# Patient Record
Sex: MALE | Race: WHITE | NOT HISPANIC OR LATINO | URBAN - METROPOLITAN AREA
[De-identification: names, ages, dates, MRNs, and addresses within clinical notes are randomized per-mention and may not be internally consistent; named-entity substitution may affect disease eponyms.]

---

## 2017-03-07 ENCOUNTER — EMERGENCY (EMERGENCY)
Facility: HOSPITAL | Age: 42
LOS: 0 days | Discharge: HOME | End: 2017-03-07

## 2017-03-21 ENCOUNTER — APPOINTMENT (OUTPATIENT)
Dept: SURGERY | Facility: CLINIC | Age: 42
End: 2017-03-21
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
## 2017-06-27 DIAGNOSIS — I10 ESSENTIAL (PRIMARY) HYPERTENSION: ICD-10-CM

## 2017-06-27 DIAGNOSIS — M79.89 OTHER SPECIFIED SOFT TISSUE DISORDERS: ICD-10-CM
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
## 2017-06-27 DIAGNOSIS — S62.306A UNSPECIFIED FRACTURE OF FIFTH METACARPAL BONE, RIGHT HAND, INITIAL ENCOUNTER FOR CLOSED FRACTURE: ICD-10-CM
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
## 2018-04-04 ENCOUNTER — TRANSCRIPTION ENCOUNTER (OUTPATIENT)
Age: 43
End: 2018-04-04

## 2018-04-20 ENCOUNTER — OUTPATIENT (OUTPATIENT)
Dept: OUTPATIENT SERVICES | Facility: HOSPITAL | Age: 43
LOS: 1 days | Discharge: HOME | End: 2018-04-20

## 2018-04-30 DIAGNOSIS — M79.89 OTHER SPECIFIED SOFT TISSUE DISORDERS: ICD-10-CM

## 2018-05-07 ENCOUNTER — EMERGENCY (EMERGENCY)
Facility: HOSPITAL | Age: 43
LOS: 0 days | Discharge: HOME | End: 2018-05-07
Attending: EMERGENCY MEDICINE | Admitting: EMERGENCY MEDICINE

## 2018-05-07 VITALS
HEART RATE: 87 BPM | WEIGHT: 315 LBS | TEMPERATURE: 100 F | OXYGEN SATURATION: 98 % | SYSTOLIC BLOOD PRESSURE: 169 MMHG | DIASTOLIC BLOOD PRESSURE: 94 MMHG | RESPIRATION RATE: 16 BRPM | HEIGHT: 70 IN
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
## 2018-05-07 DIAGNOSIS — S61.211A LACERATION WITHOUT FOREIGN BODY OF LEFT INDEX FINGER WITHOUT DAMAGE TO NAIL, INITIAL ENCOUNTER: ICD-10-CM
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
## 2018-05-07 NOTE — ED PROVIDER NOTE - ATTENDING CONTRIBUTION TO CARE
42 yo M presents with c/o cut to left index finger sustained while cutting vegetables.  Tetanus UTD.  On exam pt in NAD AAO x 3, + skin avulsion with involvement of distal nail, no bleeding, good ROM

## 2018-05-07 NOTE — ED PROVIDER NOTE - OBJECTIVE STATEMENT
44 yo M with no significant PMHx presents to the ED c/o laceration to left index finger. Pt was cutting vegetables and accidentally cut himself. Pt is up to date with tetanus. Pt denies fever, chills, nausea, numbness, weakness.

## 2018-05-07 NOTE — ED PROVIDER NOTE - PHYSICAL EXAMINATION
VITAL SIGNS: I have reviewed nursing notes and confirm.  CONSTITUTIONAL: Well-developed; well-nourished; in no acute distress.  SKIN: Skin exam is warm and dry, no acute rash.  HEAD: Normocephalic; atraumatic.  EYES: Conjunctiva and sclera clear.  EXT: Normal ROM. No clubbing, cyanosis or edema. (+) small avulsion injury to left index finger without active bleeding. Sensation intact. CR < 2 seconds.   NEURO: Alert, oriented. Grossly unremarkable. No focal deficits.

## 2019-03-20 ENCOUNTER — EMERGENCY (EMERGENCY)
Facility: HOSPITAL | Age: 44
LOS: 0 days | Discharge: HOME | End: 2019-03-20
Attending: EMERGENCY MEDICINE | Admitting: EMERGENCY MEDICINE

## 2019-03-20 VITALS
HEIGHT: 70 IN | RESPIRATION RATE: 20 BRPM | SYSTOLIC BLOOD PRESSURE: 197 MMHG | TEMPERATURE: 98 F | HEART RATE: 98 BPM | DIASTOLIC BLOOD PRESSURE: 104 MMHG | WEIGHT: 315 LBS | OXYGEN SATURATION: 98 %
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
## 2019-03-20 DIAGNOSIS — S63.602A UNSPECIFIED SPRAIN OF LEFT THUMB, INITIAL ENCOUNTER: ICD-10-CM
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
## 2019-03-20 DIAGNOSIS — F17.200 NICOTINE DEPENDENCE, UNSPECIFIED, UNCOMPLICATED: ICD-10-CM

## 2019-03-20 DIAGNOSIS — I10 ESSENTIAL (PRIMARY) HYPERTENSION: ICD-10-CM

## 2019-03-20 DIAGNOSIS — M79.643 PAIN IN UNSPECIFIED HAND: ICD-10-CM
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
## 2019-03-20 NOTE — ED PROVIDER NOTE - PHYSICAL EXAMINATION
VITAL SIGNS: AFebrile, vital signs stable  CONSTITUTIONAL: Well-developed; well-nourished; in no acute distress.  SKIN: Skin exam is warm and dry, no acute rash.  HEAD: Normocephalic; atraumatic.  EYES: Pupils equal round reactive to light, Extraocular movements intact; conjunctiva and sclera clear.  ENT: No nasal discharge; airway clear. Moist mucus membranes.  NECK: Supple; non tender. No rigidity  CARD: Regular rate and rhythm. Normal S1, S2; no murmurs, gallops, or rubs.  RESP: Lungs clear to auscultation bilaterally. No wheezes, rales or rhonchi.  ABD: Abdomen soft; non-tender; non-distended;  no hepatosplenomegaly. No costovertebral angle tenderness.   EXT: Normal ROM.  L thumbbasal carpometacarpal area localized pain without obvious swelling or loss in ROM.  No weaknesses observed  NEURO: Alert and oriented x 3. No focal deficits.  PSYCH: Cooperative, appropriate.

## 2019-03-20 NOTE — ED PROVIDER NOTE - CARE PROVIDER_API CALL
Kenn Lyles)  Orthopaedic Surgery  [address] [place] [address]  Phone: [phone]  Fax: [phone]  Follow Up Time: 7-10 Days

## 2019-03-20 NOTE — ED ADULT NURSE NOTE - NSIMPLEMENTINTERV_GEN_ALL_ED
Implemented All Universal Safety Interventions:  [place] to call system. Call bell, personal items and telephone within reach. Instruct patient to call for assistance. Room bathroom lighting operational. Non-slip footwear when patient is off stretcher. Physically safe environment: no spills, clutter or unnecessary equipment. Stretcher in lowest position, wheels locked, appropriate side rails in place.

## 2019-03-20 NOTE — ED ADULT NURSE NOTE - CCCP TRG CHIEF CMPLNT
hand pain/injury Xrays neg for acute fx. Plan is outpatient follow-up as needed with orthopedics and hard sole shoe.

## 2019-03-20 NOTE — ED PROVIDER NOTE - OBJECTIVE STATEMENT
43 yo M w no significant pmh presents after L thumb injury earlier today.  Pt was holding some books on his L hand,  which fell to the ground.  Upon retrieving those books,  pt believes he may have jammed his hand/thumb or over extended it.  He had subsequent pain to the base of L thumb area after the incident.

## 2019-03-20 NOTE — ED PROVIDER NOTE - NS ED ROS FT
Review of Systems:  •	CONSTITUTIONAL - No fever, No diaphoresis, No weight change  •	SKIN - No rash  •	HEMATOLOGIC - No abnormal bleeding or bruising  •	EYES - No eye pain, No blurred vision  •	ENT - No change in hearing, No sore throat, No neck pain, No rhinorrhea, No ear pain  •	RESPIRATORY - No shortness of breath, No cough  •	CARDIAC -No chest pain, No palpitations  •	GI - No abdominal pain, No nausea, No vomiting, No diarrhea, No constipation, No bright red blood per rectum or melena. No flank pain  •             - No dysuria, frequency, hematuria.   •	ENDO - No polydypsia, No polyuria, No heat/cold intolerance  •	MUSCULOSKELETAL - L thumb pain  •	NEUROLOGIC - No numbness, No focal weakness, No headache, No dizziness  All other systems negative, unless specified in HPI

## 2019-03-20 NOTE — ED PROVIDER NOTE - ATTENDING CONTRIBUTION TO CARE
Pt is a 45yo male who reports L thumb injury he thinks is from jamming his thumb against the books or overextending it when they fell this morning.  Came to ED to visit mother and decided to get evaluated.  No swelling, no radiation of pain.    Exam: no bony tenderness of thumb except mild soreness by base, FROM of thumb, 5/5 IP strength, 2+ radial pulse, no hand or wrist tenderness  Imp: sprain  Plan: xr

## 2019-10-18 ENCOUNTER — TRANSCRIPTION ENCOUNTER (OUTPATIENT)
Age: 44
End: 2019-10-18

## 2019-12-15 ENCOUNTER — TRANSCRIPTION ENCOUNTER (OUTPATIENT)
Age: 44
End: 2019-12-15

## 2020-01-22 ENCOUNTER — TRANSCRIPTION ENCOUNTER (OUTPATIENT)
Age: 45
End: 2020-01-22

## 2020-03-02 ENCOUNTER — TRANSCRIPTION ENCOUNTER (OUTPATIENT)
Age: 45
End: 2020-03-02

## 2020-10-10 ENCOUNTER — EMERGENCY (EMERGENCY)
Facility: HOSPITAL | Age: 45
LOS: 0 days | Discharge: HOME | End: 2020-10-10
Attending: EMERGENCY MEDICINE | Admitting: EMERGENCY MEDICINE
Payer: COMMERCIAL

## 2020-10-10 VITALS
TEMPERATURE: 100 F | HEART RATE: 97 BPM | HEIGHT: 70 IN | OXYGEN SATURATION: 98 % | RESPIRATION RATE: 22 BRPM | DIASTOLIC BLOOD PRESSURE: 90 MMHG | SYSTOLIC BLOOD PRESSURE: 169 MMHG

## 2020-10-10 DIAGNOSIS — I10 ESSENTIAL (PRIMARY) HYPERTENSION: ICD-10-CM

## 2020-10-10 DIAGNOSIS — K08.89 OTHER SPECIFIED DISORDERS OF TEETH AND SUPPORTING STRUCTURES: ICD-10-CM

## 2020-10-10 DIAGNOSIS — K13.79 OTHER LESIONS OF ORAL MUCOSA: ICD-10-CM

## 2020-10-10 PROCEDURE — 99284 EMERGENCY DEPT VISIT MOD MDM: CPT

## 2020-10-10 RX ORDER — KETOROLAC TROMETHAMINE 30 MG/ML
30 SYRINGE (ML) INJECTION ONCE
Refills: 0 | Status: DISCONTINUED | OUTPATIENT
Start: 2020-10-10 | End: 2020-10-10

## 2020-10-10 RX ADMIN — Medication 30 MILLIGRAM(S): at 06:39

## 2020-10-10 NOTE — ED PROVIDER NOTE - PATIENT PORTAL LINK FT
You can access the FollowMyHealth Patient Portal offered by [facility] by registering at the following website: [email or web address]. By joining [employer]’s FollowMyHealth portal, you will also be able to view your health information using other applications (apps) compatible with our system.

## 2020-10-10 NOTE — ED ADULT NURSE NOTE - NSIMPLEMENTINTERV_GEN_ALL_ED
Implemented All Universal Safety Interventions:  [place] to call system. Call bell, personal items and telephone within reach. Instruct patient to call for assistance. Room bathroom lighting operational. Non-slip footwear when patient is off stretcher. Physically safe environment: no spills, clutter or unnecessary equipment. Stretcher in lowest position, wheels locked, appropriate side rails in place.

## 2020-10-10 NOTE — ED PROVIDER NOTE - ATTENDING CONTRIBUTION TO CARE
I was present for and supervised the key and critical aspects of the procedures performed during the care of the patient. Patient presents for evaluation of left upper tooth tooth #15-16 pain that is moderate and throbbing in nature with no fevers or chills he is currently taking po antibiotics po nsaids with limited relief. he has no fluctuant masses or signs of abscess I will discharge with follow up to dental at this time

## 2020-10-10 NOTE — ED PROVIDER NOTE - PHYSICAL EXAMINATION
Physical Exam    Vital Signs: I have reviewed the initial vital signs.  Constitutional: well-nourished, appears stated age, no acute distress  Eyes: Conjunctiva pink, Sclera clear,.  ENT: OP is clear without exudates,ttp over tooth 3, no abscess, normal gingival, tongue without swelling,  no lymphadenopathy.  Musculoskeletal: supple neck, no lower extremity edema, no midline tenderness  Integumentary: warm, dry, no rash  Neurologic: awake, alert, nvi

## 2020-10-10 NOTE — ED PROVIDER NOTE - CLINICAL SUMMARY MEDICAL DECISION MAKING FREE TEXT BOX
patient improved with treatment I will discharge with follow up to dental clinic in the next 24-48 hours he is currently taking po antibiotics pain is improved

## 2020-10-10 NOTE — ED PROVIDER NOTE - OBJECTIVE STATEMENT
44 yo male, no pmh, presents to ed for dental pain. pt states present for several weeks, mild, aching, no radiation left upper, recently seen by private dentist and given abx, pain better withotc meds. denies nv, neck pain.

## 2020-10-10 NOTE — ED PROVIDER NOTE - NS ED ROS FT
Constitutional: (-) fever, (-) chills  ENT: (+) dental pain  Respiratory: (-) cough,   Gastrointestinal: (-) vomiting, (-)nausea,  Musculoskeletal: (-) neck pain,   Integumentary:  (-) edema  Neurological: (-) headache

## 2020-10-11 ENCOUNTER — INPATIENT (INPATIENT)
Facility: HOSPITAL | Age: 45
LOS: 3 days | Discharge: HOME | End: 2020-10-15
Attending: INTERNAL MEDICINE | Admitting: INTERNAL MEDICINE
Payer: COMMERCIAL

## 2020-10-11 VITALS
SYSTOLIC BLOOD PRESSURE: 168 MMHG | HEIGHT: 70 IN | WEIGHT: 315 LBS | OXYGEN SATURATION: 96 % | RESPIRATION RATE: 20 BRPM | TEMPERATURE: 102 F | DIASTOLIC BLOOD PRESSURE: 105 MMHG | HEART RATE: 101 BPM

## 2020-10-11 PROBLEM — G47.30 SLEEP APNEA, UNSPECIFIED: Chronic | Status: ACTIVE | Noted: 2020-10-10

## 2020-10-11 LAB
ALBUMIN SERPL ELPH-MCNC: 3.9 G/DL — SIGNIFICANT CHANGE UP (ref 3.5–5.2)
ALP SERPL-CCNC: 119 U/L — HIGH (ref 30–115)
ALT FLD-CCNC: 137 U/L — HIGH (ref 0–41)
ANION GAP SERPL CALC-SCNC: 11 MMOL/L — SIGNIFICANT CHANGE UP (ref 7–14)
AST SERPL-CCNC: 88 U/L — HIGH (ref 0–41)
BASOPHILS # BLD AUTO: 0.04 K/UL — SIGNIFICANT CHANGE UP (ref 0–0.2)
BASOPHILS NFR BLD AUTO: 0.3 % — SIGNIFICANT CHANGE UP (ref 0–1)
BILIRUB SERPL-MCNC: 1.6 MG/DL — HIGH (ref 0.2–1.2)
BUN SERPL-MCNC: 10 MG/DL — SIGNIFICANT CHANGE UP (ref 10–20)
CALCIUM SERPL-MCNC: 9.1 MG/DL — SIGNIFICANT CHANGE UP (ref 8.5–10.1)
CHLORIDE SERPL-SCNC: 100 MMOL/L — SIGNIFICANT CHANGE UP (ref 98–110)
CO2 SERPL-SCNC: 26 MMOL/L — SIGNIFICANT CHANGE UP (ref 17–32)
CREAT SERPL-MCNC: 0.9 MG/DL — SIGNIFICANT CHANGE UP (ref 0.7–1.5)
EOSINOPHIL # BLD AUTO: 0.01 K/UL — SIGNIFICANT CHANGE UP (ref 0–0.7)
EOSINOPHIL NFR BLD AUTO: 0.1 % — SIGNIFICANT CHANGE UP (ref 0–8)
GLUCOSE SERPL-MCNC: 115 MG/DL — HIGH (ref 70–99)
HCT VFR BLD CALC: 43.4 % — SIGNIFICANT CHANGE UP (ref 42–52)
HGB BLD-MCNC: 14.3 G/DL — SIGNIFICANT CHANGE UP (ref 14–18)
IMM GRANULOCYTES NFR BLD AUTO: 0.3 % — SIGNIFICANT CHANGE UP (ref 0.1–0.3)
LACTATE SERPL-SCNC: 1.2 MMOL/L — SIGNIFICANT CHANGE UP (ref 0.7–2)
LYMPHOCYTES # BLD AUTO: 0.9 K/UL — LOW (ref 1.2–3.4)
LYMPHOCYTES # BLD AUTO: 7.7 % — LOW (ref 20.5–51.1)
MCHC RBC-ENTMCNC: 27.2 PG — SIGNIFICANT CHANGE UP (ref 27–31)
MCHC RBC-ENTMCNC: 32.9 G/DL — SIGNIFICANT CHANGE UP (ref 32–37)
MCV RBC AUTO: 82.7 FL — SIGNIFICANT CHANGE UP (ref 80–94)
MONOCYTES # BLD AUTO: 0.93 K/UL — HIGH (ref 0.1–0.6)
MONOCYTES NFR BLD AUTO: 7.9 % — SIGNIFICANT CHANGE UP (ref 1.7–9.3)
MRSA PCR RESULT.: NEGATIVE — SIGNIFICANT CHANGE UP
NEUTROPHILS # BLD AUTO: 9.78 K/UL — HIGH (ref 1.4–6.5)
NEUTROPHILS NFR BLD AUTO: 83.7 % — HIGH (ref 42.2–75.2)
NRBC # BLD: 0 /100 WBCS — SIGNIFICANT CHANGE UP (ref 0–0)
PLATELET # BLD AUTO: 223 K/UL — SIGNIFICANT CHANGE UP (ref 130–400)
POTASSIUM SERPL-MCNC: 3.8 MMOL/L — SIGNIFICANT CHANGE UP (ref 3.5–5)
POTASSIUM SERPL-SCNC: 3.8 MMOL/L — SIGNIFICANT CHANGE UP (ref 3.5–5)
PROT SERPL-MCNC: 7.6 G/DL — SIGNIFICANT CHANGE UP (ref 6–8)
RAPID RVP RESULT: SIGNIFICANT CHANGE UP
RBC # BLD: 5.25 M/UL — SIGNIFICANT CHANGE UP (ref 4.7–6.1)
RBC # FLD: 14.1 % — SIGNIFICANT CHANGE UP (ref 11.5–14.5)
SARS-COV-2 RNA SPEC QL NAA+PROBE: SIGNIFICANT CHANGE UP
SODIUM SERPL-SCNC: 137 MMOL/L — SIGNIFICANT CHANGE UP (ref 135–146)
WBC # BLD: 11.7 K/UL — HIGH (ref 4.8–10.8)
WBC # FLD AUTO: 11.7 K/UL — HIGH (ref 4.8–10.8)

## 2020-10-11 PROCEDURE — 99285 EMERGENCY DEPT VISIT HI MDM: CPT

## 2020-10-11 PROCEDURE — 99223 1ST HOSP IP/OBS HIGH 75: CPT | Mod: AI,25

## 2020-10-11 PROCEDURE — 99406 BEHAV CHNG SMOKING 3-10 MIN: CPT

## 2020-10-11 PROCEDURE — 70487 CT MAXILLOFACIAL W/DYE: CPT | Mod: 26

## 2020-10-11 RX ORDER — LISINOPRIL 2.5 MG/1
5 TABLET ORAL DAILY
Refills: 0 | Status: DISCONTINUED | OUTPATIENT
Start: 2020-10-11 | End: 2020-10-15

## 2020-10-11 RX ORDER — AMPICILLIN SODIUM AND SULBACTAM SODIUM 250; 125 MG/ML; MG/ML
INJECTION, POWDER, FOR SUSPENSION INTRAMUSCULAR; INTRAVENOUS
Refills: 0 | Status: DISCONTINUED | OUTPATIENT
Start: 2020-10-11 | End: 2020-10-15

## 2020-10-11 RX ORDER — OXYCODONE AND ACETAMINOPHEN 5; 325 MG/1; MG/1
2 TABLET ORAL EVERY 6 HOURS
Refills: 0 | Status: DISCONTINUED | OUTPATIENT
Start: 2020-10-11 | End: 2020-10-15

## 2020-10-11 RX ORDER — SODIUM CHLORIDE 9 MG/ML
1000 INJECTION, SOLUTION INTRAVENOUS ONCE
Refills: 0 | Status: COMPLETED | OUTPATIENT
Start: 2020-10-11 | End: 2020-10-11

## 2020-10-11 RX ORDER — AMPICILLIN SODIUM AND SULBACTAM SODIUM 250; 125 MG/ML; MG/ML
3 INJECTION, POWDER, FOR SUSPENSION INTRAMUSCULAR; INTRAVENOUS ONCE
Refills: 0 | Status: COMPLETED | OUTPATIENT
Start: 2020-10-11 | End: 2020-10-11

## 2020-10-11 RX ORDER — KETOROLAC TROMETHAMINE 30 MG/ML
30 SYRINGE (ML) INJECTION ONCE
Refills: 0 | Status: DISCONTINUED | OUTPATIENT
Start: 2020-10-11 | End: 2020-10-11

## 2020-10-11 RX ORDER — ACETAMINOPHEN 500 MG
975 TABLET ORAL ONCE
Refills: 0 | Status: COMPLETED | OUTPATIENT
Start: 2020-10-11 | End: 2020-10-11

## 2020-10-11 RX ORDER — AMPICILLIN SODIUM AND SULBACTAM SODIUM 250; 125 MG/ML; MG/ML
3 INJECTION, POWDER, FOR SUSPENSION INTRAMUSCULAR; INTRAVENOUS EVERY 6 HOURS
Refills: 0 | Status: DISCONTINUED | OUTPATIENT
Start: 2020-10-12 | End: 2020-10-15

## 2020-10-11 RX ORDER — INFLUENZA VIRUS VACCINE 15; 15; 15; 15 UG/.5ML; UG/.5ML; UG/.5ML; UG/.5ML
0.5 SUSPENSION INTRAMUSCULAR ONCE
Refills: 0 | Status: DISCONTINUED | OUTPATIENT
Start: 2020-10-11 | End: 2020-10-15

## 2020-10-11 RX ORDER — CHLORHEXIDINE GLUCONATE 213 G/1000ML
1 SOLUTION TOPICAL
Refills: 0 | Status: DISCONTINUED | OUTPATIENT
Start: 2020-10-11 | End: 2020-10-15

## 2020-10-11 RX ORDER — ENOXAPARIN SODIUM 100 MG/ML
40 INJECTION SUBCUTANEOUS DAILY
Refills: 0 | Status: DISCONTINUED | OUTPATIENT
Start: 2020-10-11 | End: 2020-10-15

## 2020-10-11 RX ORDER — AMLODIPINE BESYLATE 2.5 MG/1
2.5 TABLET ORAL DAILY
Refills: 0 | Status: DISCONTINUED | OUTPATIENT
Start: 2020-10-11 | End: 2020-10-15

## 2020-10-11 RX ORDER — ACETAMINOPHEN 500 MG
650 TABLET ORAL EVERY 6 HOURS
Refills: 0 | Status: DISCONTINUED | OUTPATIENT
Start: 2020-10-11 | End: 2020-10-15

## 2020-10-11 RX ADMIN — AMPICILLIN SODIUM AND SULBACTAM SODIUM 200 GRAM(S): 250; 125 INJECTION, POWDER, FOR SUSPENSION INTRAMUSCULAR; INTRAVENOUS at 15:29

## 2020-10-11 RX ADMIN — Medication 650 MILLIGRAM(S): at 21:08

## 2020-10-11 RX ADMIN — Medication 30 MILLIGRAM(S): at 15:45

## 2020-10-11 RX ADMIN — Medication 650 MILLIGRAM(S): at 21:38

## 2020-10-11 RX ADMIN — SODIUM CHLORIDE 1000 MILLILITER(S): 9 INJECTION, SOLUTION INTRAVENOUS at 12:54

## 2020-10-11 RX ADMIN — Medication 975 MILLIGRAM(S): at 13:40

## 2020-10-11 RX ADMIN — AMPICILLIN SODIUM AND SULBACTAM SODIUM 200 GRAM(S): 250; 125 INJECTION, POWDER, FOR SUSPENSION INTRAMUSCULAR; INTRAVENOUS at 20:51

## 2020-10-11 NOTE — CONSULT NOTE ADULT - SUBJECTIVE AND OBJECTIVE BOX
Patient is a 45y old  Male who presents with a chief complaint of Facial swelling (11 Oct 2020 18:29)      HPI:  45 years old Male with PMH of HTN, SHANELL on Bipap, h/o cellulitis, h/o amblyopia to Right eye comes in due to left facial swelling and pain s/p dental procedure.  History of presenting illness goes back to 2 weeks ago when patient went to see his dentist for tooth pain. Pt got a dental filling ( he believes is #13) and was scheduled for root canal on November 1. The pain worsened over the course of time and patient had to visit back dentist of a refilling on Friday. Pt was put on PO amoxicillin but pain worsened and patient started experiencing left sided facial swelling on Friday night accompanied with severe pain. Last night patient experienced fever of 102F and was brought into [facility] ED south. Pt was diagnosed with dental abscess and was referred to [place] for dental evaluation.    Pt. denies active purulent drainage from mouth, otalgia, loss of hearing, vision loss or difficulty breathing.     In the ED, BP was 168/105, , Temp 101.8, RR 20, saturating well on RA. Labs are significant for leukocytosis at 11.70, elevated LFTs bilirubin 1.6, AST 88, , . CT maxillofacial shows Significant soft tissue edema overlying the left maxilla and mandible with the nonenhancing fluid collection consistent with phlegmonous changes. Prominence of the nasopharyngeal soft tissues and almost complete opacification of the mastoid air cells, with possibility of a neoplasm arising in the nasopharynx. Pt received 1L LR bolus in ED and Unasyn started. ENT was consulted MRI recommended. Pt to be admitted under medicine for further workup.    (11 Oct 2020 18:29)      PAST MEDICAL & SURGICAL HISTORY:  Sleep apnea    HTN (hypertension)    No significant past surgical history      ( -  ) heart valve replacement  ( -  ) joint replacement      MEDICATIONS  (STANDING):  amLODIPine   Tablet 2.5 milliGRAM(s) Oral daily  ampicillin/sulbactam  IVPB      chlorhexidine 4% Liquid 1 Application(s) Topical <User Schedule>  enoxaparin Injectable 40 milliGRAM(s) SubCutaneous daily  influenza   Vaccine 0.5 milliLiter(s) IntraMuscular once  lisinopril 5 milliGRAM(s) Oral daily    MEDICATIONS  (PRN):  acetaminophen   Tablet .. 650 milliGRAM(s) Oral every 6 hours PRN Temp greater or equal to 38C (100.4F), Mild Pain (1 - 3)  oxycodone    5 mG/acetaminophen 325 mG 2 Tablet(s) Oral every 6 hours PRN Moderate Pain (4 - 6)      Allergies    No Known Allergies    Intolerances        FAMILY HISTORY:  No pertinent family history in first degree relatives        *SOCIAL HISTORY: ( +  ) Tobacco; (  - ) ETOH    *Last Dental Visit: last week    Vital Signs Last 24 Hrs  T(C): 37.1 (11 Oct 2020 19:48), Max: 38.8 (11 Oct 2020 12:34)  T(F): 98.8 (11 Oct 2020 19:48), Max: 101.8 (11 Oct 2020 12:34)  HR: 80 (11 Oct 2020 19:48) (80 - 101)  BP: 103/56 (11 Oct 2020 19:48) (103/56 - 179/94)  BP(mean): --  RR: 18 (11 Oct 2020 19:48) (18 - 20)  SpO2: 98% (11 Oct 2020 19:48) (96% - 99%)    LABS:                        14.3   11.70 )-----------( 223      ( 11 Oct 2020 13:02 )             43.4     10-11    137  |  100  |  10  ----------------------------<  115<H>  3.8   |  26  |  0.9    Ca    9.1      11 Oct 2020 13:02    TPro  7.6  /  Alb  3.9  /  TBili  1.6<H>  /  DBili  x   /  AST  88<H>  /  ALT  137<H>  /  AlkPhos  119<H>  10-11    Platelet Count - Automated: 223 K/uL [130 - 400] (10-11 @ 13:02)  WBC Count: 11.70 K/uL <H> [4.80 - 10.80] (10-11 @ 13:02)          EOE:  TMJ ( -  ) clicks                     ( -  ) pops                     ( -  ) crepitus             Mandible <<FROM>>             Facial bones and MOM <<grossly intact>>             ( -  ) trismus             (  - ) lymphadenopathy             (  + ) swelling on left side of maxillomandibular region             (  + ) asymmetry             ( +  ) palpation: palpable fluctuant  on left side on the face             (  - ) dyspnea             (-   ) dysphagia             ( -  ) loss of consciousness    IOE:  permanent> dentition: <<grossly intact>>            hard/soft palate:  no pathology noted           tongue/FOM <<No pathology noted>>           labial/buccal mucosa : swelling could be palpated from the left buccal mucosa           ( -  ) percussion :negative to percussion #12,13,14, 18,19.            (  + ) palpation           (  + ) swelling : swelling / mass from the buccal mucosa feels harder than the surrounding tissue            ( +  ) abscess           (  - ) sinus tract        *ASSESSMENT: swelling possible odontogenic origin      *PLAN: to incision and drainage the next morning 10/12/2020. continue pain medications and antibiotics.      RECOMMENDATIONS:  1) Continue pain medications and antibiotics  2) incision and drainage to be done by OMFS   3) Dental F/U with outpatient dentist for comprehensive dental care.   4) If any difficulty swallowing/breathing, fever occur, page dental    Lexii Archuleta DDS , pager #[phone]

## 2020-10-11 NOTE — H&P ADULT - HISTORY OF PRESENT ILLNESS
45 years old Male with PMH of HTN, SHANELL on Bipap, h/o cellulitis, h/o amblyopia to Right eye c/o left facial swelling and pain s/p detal procedure  started feeling increased pain last Friday started on PO Abx w/o improvement and worsening of symptoms. [place] ED [place] Dx with dental abscess being sent to [facility] for dental evaluation ENT called to f/u for CT M/F incidental findings of Prominence of the nasopharyngeal soft tissues and almost complete opacification of the mastoid air cells, the possibility of a neoplasm arising in the nasopharynx should be considered.   Febrile at admission.  Active smoker x 20 years. Pt. c/o Headache and facial pain 10/10  Pt. denies active purulent drainage from mouth, otalgia, loss of hearing, vision loss or difficulty breathing.     In the ED, BP was 168/105, , Temp 101.8, RR 20, saturating well on RA. Labs are significant for leukocytosis at 11.70, elevated LFTs bilirubin 1.6, AST 88, , . CT maxillofacial shows Significant soft tissue edema overlying the left maxilla and mandible with the nonenhancing fluid collection consistent with phlegmonous changes. Prominence of the nasopharyngeal soft tissues and almost complete opacification of the mastoid air cells, with possibility of a neoplasm arising in the nasopharynx. Pt received 1L LR bolus in ED and Unasyn started. ENT was consulted MRI recommended. Pt to be admitted under medicine for further workup.    45 years old Male with PMH of HTN, SHANELL on Bipap, h/o cellulitis, h/o amblyopia to Right eye comes in due to left facial swelling and pain s/p dental procedure.  History of presenting illness goes back to 2 weeks ago when patient went to see his dentist for tooth pain. Pt got a dental filling and was scheduled for root canal on November 1. The pain worsened over the course of time and patient had to visit back dentist of a refilling on Friday. Pt was put on PO amoxicillin but pain worsened and patient started experiencing left sided facial swelling on Friday night accompanied with severe pain. Last night patient experienced fever of 102F and was brought into [facility] ED south. Pt was diagnosed with dental abscess and was referred to [place] for dental evaluation.    Pt. denies active purulent drainage from mouth, otalgia, loss of hearing, vision loss or difficulty breathing.     In the ED, BP was 168/105, , Temp 101.8, RR 20, saturating well on RA. Labs are significant for leukocytosis at 11.70, elevated LFTs bilirubin 1.6, AST 88, , . CT maxillofacial shows Significant soft tissue edema overlying the left maxilla and mandible with the nonenhancing fluid collection consistent with phlegmonous changes. Prominence of the nasopharyngeal soft tissues and almost complete opacification of the mastoid air cells, with possibility of a neoplasm arising in the nasopharynx. Pt received 1L LR bolus in ED and Unasyn started. ENT was consulted MRI recommended. Pt to be admitted under medicine for further workup.

## 2020-10-11 NOTE — ED PROVIDER NOTE - ATTENDING CONTRIBUTION TO CARE
I was present for and supervised the key and critical aspects of the procedures performed during the care of the patient. patient presents for worsening pain and swelling on the left side of his face. worse over the past 24 hours patient has significant swelling and pain   On physical exam he is tolerating his secretions no resp distress but however has swelling to the left lower side of his face   we obtained labs as well as ct of the face I will transfer patient for dental evaluation

## 2020-10-11 NOTE — H&P ADULT - ASSESSMENT
# Facial Soft tissue infection / possible Noble angina  - CT maxillofacial shows Significant soft tissue edema overlying the left maxilla and mandible with the nonenhancing fluid collection consistent with phlegmonous changes. Prominence of the nasopharyngeal soft tissues and almost complete opacification of the mastoid air cells, with possibility of a neoplasm arising in the nasopharynx.   - will order BCX, ESR, CRP, MRSA swab  - MRI ordered as per ENT  - Unasyn 3g IV q6  - consult ENT, Dental and ID    # HTN - c/w home medication  # SHANELL - f/u out patient pulm    # Diet: DASH/TC  # DVT Prophylaxis: Lovenox  # GI Prophylaxis::  # Activity: IAT  # IV Fluids:   # Dispo: Acute   # Code Status: Full code  # CHG wash 45 years old Male with PMH of HTN, SHANELL on Bipap, h/o cellulitis, h/o amblyopia to Right eye comes in due to left facial swelling and pain s/p dental procedure.    #Submandibular / Submaxillary space infection / Dental abscess  - CT maxillofacial shows Significant soft tissue edema overlying the left maxilla and mandible with the nonenhancing fluid collection consistent with phlegmonous changes. Prominence of the nasopharyngeal soft tissues and almost complete opacification of the mastoid air cells, with possibility of a neoplasm arising in the nasopharynx.   - will order BCX, ESR, CRP, MRSA swab  - MRI ordered as per ENT  - Unasyn 3g IV q6  - soft diet, bedside dysphagia screen and 1:1 feeding  - consult ENT, Dental for possible drainage and ID    # Transaminitis  - check US abdomen, Hepatitis panel  - if negative can check CLD workup  - can consider GI eval     # HTN - start amlodipine 2.5mg qd and lisinopril 5mg PO qd  # SHANELL - f/u out patient pulmonary, CPAP setting 17-21    # Diet: soft diet  # DVT Prophylaxis: Lovenox  # Activity: IAT  # Dispo: Acute   # Code Status: Full code

## 2020-10-11 NOTE — ED PROVIDER NOTE - CLINICAL SUMMARY MEDICAL DECISION MAKING FREE TEXT BOX
patient presents for evaluation of worsening dental pain and swelling this is the second evaluation for worsening pain he has worsened we obtained ct administered iv antibiotics I will transfer patient for dental evaluation.

## 2020-10-11 NOTE — ED PROVIDER NOTE - PHYSICAL EXAMINATION
VITAL SIGNS: I have reviewed nursing notes and confirm.  CONSTITUTIONAL: Well-developed; well-nourished; in no acute distress.  SKIN: Skin exam is warm and dry, no acute rash.  HEAD: Normocephalic; atraumatic.  EYES: PERRL, EOM intact; conjunctiva and sclera clear.  ENT: No nasal discharge; airway clear. +Tenderness to percussion to tooth #13. +Left facial swelling.   NECK: Supple; non tender.  CARD: S1, S2 normal; no murmurs, gallops, or rubs. Regular rate and rhythm.  RESP: Clear to auscultation bilaterally. No wheezes, rales or rhonchi.  ABD: Normal bowel sounds; soft; non-distended; non-tender.   EXT: Normal ROM. No edema.  LYMPH: No acute cervical adenopathy.  NEURO: Alert, oriented. Grossly unremarkable. No focal deficits.  PSYCH: Cooperative, appropriate.

## 2020-10-11 NOTE — CONSULT NOTE ADULT - SUBJECTIVE AND OBJECTIVE BOX
Patient is a 45y old  Male who presents with a chief complaint of     HPI:  45 y.o M with PMH of HTN, SHANELL, cellulitis c/o facial swelling and pain s/p detal procedure last Friday started on PO Abx. In ED Dx with dental abscess being sent to [facility] for dental evaluation ENT called to f/u for CT M/F incidental findings of Prominence of the nasopharyngeal soft tissues and almost complete opacification of the mastoid air cells, the possibility of a neoplasm arising in the nasopharynx should be considered.   Febrile     PAST MEDICAL & SURGICAL HISTORY:  Sleep apnea    HTN (hypertension)    No significant past surgical history        REVIEW OF SYSTEMS:  CONSTITUTIONAL:  no fevers or chills  HEENT: No visual changes  ENDO: No sweating  NECK: No pain or stiffness  MUSCULOSKELETAL: No back pain, no joint pain  RESPIRATORY: No shortness of breath  CARDIOVASCULAR: No chest pain  GASTROINTESTINAL: No abdominal or epigastric pain. No nausea, vomiting,  No diarrhea or constipation.   NEUROLOGICAL: No mental status changes  PSYCH: No depression, no mood changes  SKIN: No itching  ENT as per HPI    MEDICATIONS:          Allergies: No Known Allergies      SOCIAL HISTORY: No illicit drug use, smoking, ETOH     FAMILY HISTORY:  No pertinent family history in first degree relatives      Vital Signs Last 24 Hrs  T(C): 38 (11 Oct 2020 15:01), Max: 38.8 (11 Oct 2020 12:34)  T(F): 100.4 (11 Oct 2020 15:01), Max: 101.8 (11 Oct 2020 12:34)  HR: 86 (11 Oct 2020 15:46) (86 - 101)  BP: 140/77 (11 Oct 2020 15:46) (140/77 - 168/105)  RR: 18 (11 Oct 2020 15:46) (18 - 20)  SpO2: 98% (11 Oct 2020 15:46) (96% - 98%)     PHYSICAL EXAM:  Constitutional: NAD, well-developed  HEENT: NC/AT, EOMI  Nose;  Mouth:  Neck:  Back: No CVA tenderness  Respiratory: No accessory respiratory muscle use  Abd: Soft, NT/ND   Extremities: SUE x 4 no edema  Neurological: A/O x 3  Psychiatric: Normal mood, normal affect  Skin: No rashes       LABS:                        14.3   11.70 )-----------( 223      ( 11 Oct 2020 13:02 )             43.4     10-11    137  |  100  |  10  ----------------------------<  115<H>  3.8   |  26  |  0.9    Ca    9.1      11 Oct 2020 13:02    TPro  7.6  /  Alb  3.9  /  TBili  1.6<H>  /  DBili  x   /  AST  88<H>  /  ALT  137<H>  /  AlkPhos  119<H>  10-11         RADIOLOGY & ADDITIONAL STUDIES:   < from: CT Maxillofacial w/ IV Cont (10.11.20 @ 14:26) >    EXAM:  CT MAXILLOFACIAL IC            PROCEDURE DATE:  10/11/2020            INTERPRETATION:  Clinical History / Reason for exam:Left facial swelling, post dental procedure.    COMPARISON: None    TECHNIQUE: Multiple axial CT images of the maxillofacial region of the skull were obtained with coronal and sagittal reformats following administration of 95 mL Optiray 320 intravenous contrast, 5 mL discarded.    FINDINGS:    Significant soft tissue edema with phlegmonous changes overlying the left left side of the mandible and maxilla extending into the left side of the soft tissues of the face. Small fluid collection adjacent to the left maxilla without an enhancing rim.    Prominent adjacent    The frontal sinuses are hypoplastic.    Minimal mucosal thickening in the left frontal sinus, mild mucosal thickening in the left ethmoid sinus, and almost complete opacification of the left maxillary sinus with blockage of the left infundibulum and left ostiomeatal complex.    Diffuse prominence ofthe nasopharyngeal soft tissues. The possibility of neoplasm (e.g lymphoma) should be considered.    Almost complete opacification of the mastoid air cells consistent with inflammation or infection.    There is a partially empty sella.    IMPRESSION:    1.  Significant soft tissue edema overlying the left maxilla and mandible with the nonenhancing fluid collection consistent with phlegmonous changes.    2.  Prominence of the nasopharyngeal soft tissues and almost complete opacification of the mastoid air cells, the possibility of a neoplasm arising in the nasopharynx should be considered.    Spoke with BRE TILLMAN MD on 10/11/2020 3:25 PM with readback.        TANMAY BAXTER M.D., RESIDENT RADIOLOGIST  This document has been electronically signed.  KIRSTEN NASH M.D., ATTENDING RADIOLOGIST  This document has been electronically signed. Oct 11 2020  3:44PM    < end of copied text >   Patient is a 45y old  Male who presents with a chief complaint of     HPI:  45 y.o M with PMH of HTN, SHANELL on Bipap, h/o cellulitis, c/o left facial swelling and pain s/p detal procedure  started feeling increased pain last Friday started on PO Abx w/o improvement and worsening of symptoms. In ED Dx with dental abscess being sent to [facility] for dental evaluation ENT called to f/u for CT M/F incidental findings of Prominence of the nasopharyngeal soft tissues and almost complete opacification of the mastoid air cells, the possibility of a neoplasm arising in the nasopharynx should be considered.   Febrile at admission.  Active smoker x 20 years. Pt. c/o Headache and facial pain 10/10  denies active purulent drainage from mouth, earache, loss of hearing, difficulty breathing.     PAST MEDICAL & SURGICAL HISTORY:  Sleep apnea    HTN (hypertension)    No significant past surgical history      REVIEW OF SYSTEMS:  CONSTITUTIONAL:  no fevers or chills  HEENT: No visual changes  ENDO: No sweating  NECK: No pain or stiffness  MUSCULOSKELETAL: No back pain, no joint pain  RESPIRATORY: No shortness of breath  CARDIOVASCULAR: No chest pain  GASTROINTESTINAL: No abdominal or epigastric pain. No nausea, vomiting,  No diarrhea or constipation.   NEUROLOGICAL: No mental status changes  PSYCH: No depression, no mood changes  SKIN: No itching  ENT as per HPI    MEDICATIONS  (STANDING):  influenza   Vaccine 0.5 milliLiter(s) IntraMuscular once       Allergies: No Known Allergies      SOCIAL HISTORY: No illicit drug use, smoking, ETOH     FAMILY HISTORY:  No pertinent family history in first degree relatives      Vital Signs Last 24 Hrs  T(C): 38 (11 Oct 2020 15:01), Max: 38.8 (11 Oct 2020 12:34)  T(F): 100.4 (11 Oct 2020 15:01), Max: 101.8 (11 Oct 2020 12:34)  HR: 86 (11 Oct 2020 15:46) (86 - 101)  BP: 140/77 (11 Oct 2020 15:46) (140/77 - 168/105)  RR: 18 (11 Oct 2020 15:46) (18 - 20)  SpO2: 98% (11 Oct 2020 15:46) (96% - 98%)     PHYSICAL EXAM:  Constitutional: Obese, NAD, well-developed  HEENT: NC, EOMI + left facial swelling extending to left neck, + ttp  Nose; B/L nares patent no d/c  Ears: No TTP B/L pre/post and auricular areas, B/L EAC, with mild ear wax B/L TM intact, nonbulging, no purulent d/c, no ttp over mastoid area, mastoid area w/o edema and erythema.   Mouth: oral mucosa pink and moist uvula midline left buccal mucosa appears swollen and tender, no ttp over FOM.      Neck: + left sided LAD  Back: No CVA tenderness  Respiratory: No accessory respiratory muscle use  Abd: Obese, Soft, NT/ND   Extremities: SUE x 4 no edema  Neurological: A/O x 3  Psychiatric: Normal mood, normal affect  Skin: No rashes       LABS:                        14.3   11.70 )-----------( 223      ( 11 Oct 2020 13:02 )             43.4     10-11    137  |  100  |  10  ----------------------------<  115<H>  3.8   |  26  |  0.9    Ca    9.1      11 Oct 2020 13:02    TPro  7.6  /  Alb  3.9  /  TBili  1.6<H>  /  DBili  x   /  AST  88<H>  /  ALT  137<H>  /  AlkPhos  119<H>  10-11         RADIOLOGY & ADDITIONAL STUDIES:   < from: CT Maxillofacial w/ IV Cont (10.11.20 @ 14:26) >    EXAM:  CT MAXILLOFACIAL IC            PROCEDURE DATE:  10/11/2020            INTERPRETATION:  Clinical History / Reason for exam:Left facial swelling, post dental procedure.    COMPARISON: None    TECHNIQUE: Multiple axial CT images of the maxillofacial region of the skull were obtained with coronal and sagittal reformats following administration of 95 mL Optiray 320 intravenous contrast, 5 mL discarded.    FINDINGS:    Significant soft tissue edema with phlegmonous changes overlying the left left side of the mandible and maxilla extending into the left side of the soft tissues of the face. Small fluid collection adjacent to the left maxilla without an enhancing rim.    Prominent adjacent    The frontal sinuses are hypoplastic.    Minimal mucosal thickening in the left frontal sinus, mild mucosal thickening in the left ethmoid sinus, and almost complete opacification of the left maxillary sinus with blockage of the left infundibulum and left ostiomeatal complex.    Diffuse prominence ofthe nasopharyngeal soft tissues. The possibility of neoplasm (e.g lymphoma) should be considered.    Almost complete opacification of the mastoid air cells consistent with inflammation or infection.    There is a partially empty sella.    IMPRESSION:    1.  Significant soft tissue edema overlying the left maxilla and mandible with the nonenhancing fluid collection consistent with phlegmonous changes.    2.  Prominence of the nasopharyngeal soft tissues and almost complete opacification of the mastoid air cells, the possibility of a neoplasm arising in the nasopharynx should be considered.    Spoke with BRE TILLMAN MD on 10/11/2020 3:25 PM with readback.        TANMAY BAXTER M.D., RESIDENT RADIOLOGIST  This document has been electronically signed.  KIRSTEN NASH M.D., ATTENDING RADIOLOGIST  This document has been electronically signed. Oct 11 2020  3:44PM    < end of copied text >   Patient is a 45y old  Male who presents with a chief complaint of     HPI:  45 y.o M with PMH of HTN, SHANELL on Bipap, h/o cellulitis, h/o amblyopia to Right eye c/o left facial swelling and pain s/p detal procedure  started feeling increased pain last Friday started on PO Abx w/o improvement and worsening of symptoms. In ED Dx with dental abscess being sent to [facility] [place] for dental evaluation ENT called to f/u for CT M/F incidental findings of Prominence of the nasopharyngeal soft tissues and almost complete opacification of the mastoid air cells, the possibility of a neoplasm arising in the nasopharynx should be considered.   Febrile at admission.  Active smoker x 20 years. Pt. c/o Headache and facial pain 10/10  Pt. denies active purulent drainage from mouth, otalgia, loss of hearing, vision loss or difficulty breathing.     PAST MEDICAL & SURGICAL HISTORY:  Sleep apnea    HTN (hypertension)    No significant past surgical history      REVIEW OF SYSTEMS:  CONSTITUTIONAL:  no fevers or chills  HEENT: No visual changes  ENDO: No sweating  NECK: No pain or stiffness  MUSCULOSKELETAL: No back pain, no joint pain  RESPIRATORY: No shortness of breath  CARDIOVASCULAR: No chest pain  GASTROINTESTINAL: No abdominal or epigastric pain. No nausea, vomiting,  No diarrhea or constipation.   NEUROLOGICAL: No mental status changes  PSYCH: No depression, no mood changes  SKIN: No itching  ENT as per HPI    MEDICATIONS  (STANDING):  influenza   Vaccine 0.5 milliLiter(s) IntraMuscular once       Allergies: No Known Allergies      SOCIAL HISTORY: No illicit drug use, smoking, ETOH     FAMILY HISTORY:  No pertinent family history in first degree relatives      Vital Signs Last 24 Hrs  T(C): 38 (11 Oct 2020 15:01), Max: 38.8 (11 Oct 2020 12:34)  T(F): 100.4 (11 Oct 2020 15:01), Max: 101.8 (11 Oct 2020 12:34)  HR: 86 (11 Oct 2020 15:46) (86 - 101)  BP: 140/77 (11 Oct 2020 15:46) (140/77 - 168/105)  RR: 18 (11 Oct 2020 15:46) (18 - 20)  SpO2: 98% (11 Oct 2020 15:46) (96% - 98%)     PHYSICAL EXAM:  Constitutional: Obese, NAD, well-developed  HEENT: NC, EOMI   amblyopia to right eye at baseline , + left facial swelling extending to left neck, + ttp  Nose; B/L nares patent no d/c  Ears: No TTP B/L pre/post and auricular areas, B/L EAC, with mild ear wax B/L TM intact, nonbulging, no purulent d/c, no ttp over mastoid area, mastoid area w/o edema and erythema.   Mouth: oral mucosa pink and moist uvula midline left buccal mucosa appears swollen and tender, no ttp over FOM.      Neck: + left sided LAD  Back: No CVA tenderness  Respiratory: No accessory respiratory muscle use  Abd: Obese, Soft, NT/ND   Extremities: SUE x 4 no edema  Neurological: A/O x 3  Psychiatric: Normal mood, normal affect  Skin: No rashes       LABS:                        14.3   11.70 )-----------( 223      ( 11 Oct 2020 13:02 )             43.4     10-11    137  |  100  |  10  ----------------------------<  115<H>  3.8   |  26  |  0.9    Ca    9.1      11 Oct 2020 13:02    TPro  7.6  /  Alb  3.9  /  TBili  1.6<H>  /  DBili  x   /  AST  88<H>  /  ALT  137<H>  /  AlkPhos  119<H>  10-11         RADIOLOGY & ADDITIONAL STUDIES:   < from: CT Maxillofacial w/ IV Cont (10.11.20 @ 14:26) >    EXAM:  CT MAXILLOFACIAL IC            PROCEDURE DATE:  10/11/2020            INTERPRETATION:  Clinical History / Reason for exam:Left facial swelling, post dental procedure.    COMPARISON: None    TECHNIQUE: Multiple axial CT images of the maxillofacial region of the skull were obtained with coronal and sagittal reformats following administration of 95 mL Optiray 320 intravenous contrast, 5 mL discarded.    FINDINGS:    Significant soft tissue edema with phlegmonous changes overlying the left left side of the mandible and maxilla extending into the left side of the soft tissues of the face. Small fluid collection adjacent to the left maxilla without an enhancing rim.    Prominent adjacent    The frontal sinuses are hypoplastic.    Minimal mucosal thickening in the left frontal sinus, mild mucosal thickening in the left ethmoid sinus, and almost complete opacification of the left maxillary sinus with blockage of the left infundibulum and left ostiomeatal complex.    Diffuse prominence ofthe nasopharyngeal soft tissues. The possibility of neoplasm (e.g lymphoma) should be considered.    Almost complete opacification of the mastoid air cells consistent with inflammation or infection.    There is a partially empty sella.    IMPRESSION:    1.  Significant soft tissue edema overlying the left maxilla and mandible with the nonenhancing fluid collection consistent with phlegmonous changes.    2.  Prominence of the nasopharyngeal soft tissues and almost complete opacification of the mastoid air cells, the possibility of a neoplasm arising in the nasopharynx should be considered.    Spoke with BRE TILLMAN MD on 10/11/2020 3:25 PM with readback.        TANMAY BAXTER M.D., RESIDENT RADIOLOGIST  This document has been electronically signed.  KIRSTEN NASH M.D., ATTENDING RADIOLOGIST  This document has been electronically signed. Oct 11 2020  3:44PM    < end of copied text >

## 2020-10-11 NOTE — ED PROVIDER NOTE - OBJECTIVE STATEMENT
44 yo M with no pmhx presenting with left facial swelling x 1 day. Was seen by dentist on Friday had temporary filling on tooth #13 and was told that he will need a root canal. States that he has been on amoxicillin since Friday. Reports subjective fever. No chills, difficulty breathing, foul odor, trismus, discharge, warmth, decreased PO fluids, malaise, loose teeth.

## 2020-10-11 NOTE — H&P ADULT - NSHPPHYSICALEXAM_GEN_ALL_CORE
PHYSICAL EXAM:  CONSTITUTIONAL: Well-developed; well-nourished; in no acute distress.  SKIN: Skin exam is warm and dry, no acute rash.  HEAD: Normocephalic; atraumatic.  EYES: PERRL, EOM intact; conjunctiva and sclera clear.  ENT: No nasal discharge; airway clear. +Tenderness to percussion to tooth #13. +Left facial swelling.   NECK: Supple; non tender.  CARD: S1, S2 normal; no murmurs, gallops, or rubs. Regular rate and rhythm.  RESP: Clear to auscultation bilaterally. No wheezes, rales or rhonchi.  ABD: Normal bowel sounds; soft; non-distended; non-tender.   EXT: Normal ROM. No edema.  LYMPH: No acute cervical adenopathy.  NEURO: Alert, oriented. Grossly unremarkable. No focal deficits.  PSYCH: Cooperative, appropriate.

## 2020-10-11 NOTE — ED ADULT TRIAGE NOTE - CHIEF COMPLAINT QUOTE
"I am having left sided tooth pain s/p dental procedure on friday, I was given antibiotics but my face is swollen and it hurts"

## 2020-10-11 NOTE — CONSULT NOTE ADULT - ASSESSMENT
Awaiting for Pt. transfer from [facility] to [facility]   45 y.o M with left facial swelling since Friday had dental work done p/w worse pain and swelling today, febrile. Dx with dental abscess.  ENT called to evaluate Pt. for CT MF findings of significant soft tissue edema overlying the left maxilla and mandible with the nonenhancing fluid collection consistent with phlegmonous changes.  Prominence of the nasopharyngeal soft tissues and almost complete opacification of the mastoid air cells, the possibility of a neoplasm arising in the nasopharynx should be considered.    Plan:  IV Abx as per ID  Dental services eval  MRI of head w/o IVC  ENT attending will f/u will do nasopharyngeal scope on rounds.   D/W Dr. Gracia 45 y.o M with left facial swelling since Friday had dental work done p/w worse pain and swelling today, febrile. Dx with dental abscess.  ENT called to evaluate Pt. for CT MF findings of significant soft tissue edema overlying the left maxilla and mandible with the nonenhancing fluid collection consistent with phlegmonous changes.  Prominence of the nasopharyngeal soft tissues and almost complete opacification of the mastoid air cells, the possibility of a neoplasm arising in the nasopharynx should be considered.    Plan:  IV Abx as per ID  Dental services eval  MRI of head w/o IVC  ENT attending will f/u, will do nasopharyngeal scope on rounds.   D/W Dr. Gracia

## 2020-10-11 NOTE — H&P ADULT - ATTENDING COMMENTS
44 YO M with a PMH of HTN, SHANELL on Bipap, h/o cellulitis, and h/o amblyopia to Right eye who presents to the hospital with a c/o left facial swelling s/p dental procedure comes in due to left facial swelling and pain s/p dental procedure on 10/9 for a temporary filling. Associated with pain and fevers. Denies any difficulty with swallowing or breathing. In the ED, a CT-maxillofacial w/ IV contrast showed soft tissue edema overlying the left maxilla/mandible with the nonenhancing fluid collection consistent with phlegmonous changes and prominence of the nasopharyngeal soft tissues and almost complete opacification of the mastoid air cells, consider neoplasm of the nasopharynx.     ENT consulted and they would like ID consult, MRI, IV ABXs, and will perform nasopharyngeal scope in the AM. Pt started on IV ABXs (Unasyn) and IVFs.     Physical exam shows pt in NAD. VSS, afebrile, not hypoxic on RA. A&Ox3. Left facial swelling that extends towards the neck w/ TTP, LAD on left neck is present. Non-focal neuro exam. Muscle strength/sensation intact. CTA B/L with no W/C/R. RRR, no M/G/R. ABD is soft and non-tender, normoactive BSs. LEs without swelling. No rashes. Labs and radiology as above.     Left-sided facial swelling/pain s/p dental procedure (temporary filling), rule out dental abscess vs submandibular infection; Sepsis present on admission. ENT is following. ID consult. IV ABXs (Unasyn). FU cultures. PRN pain meds. Monitor airway. MRI. IVFs (LR).     Transaminitis, likely end organ damage from above. IVFs (LR). Repeat LFTs in the AM.     Hx of HTN, SHANELL on Bipap, and h/o amblyopia to Right eye. Restart home meds. DVT PPX. Inform PCP of pt's admission to hospital. My note supersedes the residents note. 46 YO M with a PMH of HTN, SHANELL on Bipap, h/o cellulitis, and h/o amblyopia to Right eye who presents to the hospital with a c/o left facial swelling s/p dental procedure comes in due to left facial swelling and pain s/p dental procedure on 10/9 for a temporary filling. Associated with pain and fevers. Denies any difficulty with swallowing or breathing. In the ED, a CT-maxillofacial w/ IV contrast showed soft tissue edema overlying the left maxilla/mandible with the nonenhancing fluid collection consistent with phlegmonous changes and prominence of the nasopharyngeal soft tissues and almost complete opacification of the mastoid air cells, consider neoplasm of the nasopharynx.     ENT consulted and they would like ID consult, MRI, IV ABXs, and will perform nasopharyngeal scope in the AM. Pt started on IV ABXs (Unasyn) and IVFs.     Physical exam shows pt in NAD. VSS, afebrile, not hypoxic on RA. A&Ox3. Left facial swelling that extends towards the neck w/ TTP, LAD on left neck is present. Non-focal neuro exam. Muscle strength/sensation intact. CTA B/L with no W/C/R. RRR, no M/G/R. ABD is soft and non-tender, normoactive BSs. LEs without swelling. No rashes. Labs and radiology as above.     Left-sided facial swelling/pain s/p dental procedure (temporary filling), rule out dental abscess vs submandibular infection; Sepsis present on admission. ENT is following. ID consult. IV ABXs (Unasyn). FU cultures. PRN pain meds. Monitor airway. MRI. IVFs (LR).     Transaminitis, likely end organ damage from above. IVFs (LR). Repeat LFTs in the AM.     Tobacco abuse with counseling. Pt extensively counseled on the benefits of smoking cessation and alternative therapies. Pt would like to think about his options prior to making a decision.     Hx of HTN, SHANELL on Bipap, and h/o amblyopia to Right eye. Restart home meds. DVT PPX. Inform PCP of pt's admission to hospital. My note supersedes the residents note.

## 2020-10-11 NOTE — CONSULT NOTE ADULT - ATTENDING COMMENTS
patient seen and examined at bedside.  CT images reviewed and discussed with patient.    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    Patient to follow up as outpatient within 10 days to schedule a biopsy.     All questions were answered.

## 2020-10-11 NOTE — H&P ADULT - NSHPLABSRESULTS_GEN_ALL_CORE
(10-11 @ 13:02)                      14.3  11.70 )-----------( 223                 43.4    Neutrophils = 9.78 (83.7%)  Lymphocytes = 0.90 (7.7%)  Eosinophils = 0.01 (0.1%)  Basophils = 0.04 (0.3%)  Monocytes = 0.93 (7.9%)  Bands = --%    10-11    137  |  100  |  10  ----------------------------<  115<H>  3.8   |  26  |  0.9    Ca    9.1      11 Oct 2020 13:02    TPro  7.6  /  Alb  3.9  /  TBili  1.6<H>  /  DBili  x   /  AST  88<H>  /  ALT  137<H>  /  AlkPhos  119<H>  10-11  < from: CT Maxillofacial w/ IV Cont (10.11.20 @ 14:26) >    IMPRESSION:    1.  Significant soft tissue edema overlying the left maxilla and mandible with the nonenhancing fluid collection consistent with phlegmonous changes.    2.  Prominence of the nasopharyngeal soft tissues and almost complete opacification of the mastoid air cells, the possibility of a neoplasm arising in the nasopharynx should be considered.    < end of copied text >

## 2020-10-11 NOTE — ED PROVIDER NOTE - NS ED ROS FT
Review of Systems:  	•	CONSTITUTIONAL - no fever, no diaphoresis, no chills  	•	SKIN - no rash  	•	HEMATOLOGIC - no bleeding, no bruising  	•	EYES - no eye pain, no blurry vision  	•	ENT - +dental pain, +facial swelling, no congestion  	•	RESPIRATORY - no shortness of breath, no cough  	•	CARDIAC - no chest pain, no palpitations  	•	GI - no abd pain, no nausea, no vomiting, no diarrhea, no constipation  	•	GENITO-URINARY - no dysuria; no hematuria, no increased urinary frequency  	•	MUSCULOSKELETAL - no joint paint, no swelling, no redness  	•	NEUROLOGIC - no weakness, no headache, no paresthesias, no LOC  	•	PSYCH - no anxiety, no depression  	All other ROS are negative except as documented in HPI.

## 2020-10-12 LAB
-  COAGULASE NEGATIVE STAPHYLOCOCCUS: SIGNIFICANT CHANGE UP
A1C WITH ESTIMATED AVERAGE GLUCOSE RESULT: 5.9 % — HIGH (ref 4–5.6)
ALBUMIN SERPL ELPH-MCNC: 3.7 G/DL — SIGNIFICANT CHANGE UP (ref 3.5–5.2)
ALP SERPL-CCNC: 121 U/L — HIGH (ref 30–115)
ALT FLD-CCNC: 149 U/L — HIGH (ref 0–41)
ANION GAP SERPL CALC-SCNC: 12 MMOL/L — SIGNIFICANT CHANGE UP (ref 7–14)
APTT BLD: 30 SEC — SIGNIFICANT CHANGE UP (ref 27–39.2)
AST SERPL-CCNC: 77 U/L — HIGH (ref 0–41)
BASOPHILS # BLD AUTO: 0.03 K/UL — SIGNIFICANT CHANGE UP (ref 0–0.2)
BASOPHILS NFR BLD AUTO: 0.2 % — SIGNIFICANT CHANGE UP (ref 0–1)
BILIRUB SERPL-MCNC: 1.2 MG/DL — SIGNIFICANT CHANGE UP (ref 0.2–1.2)
BUN SERPL-MCNC: 10 MG/DL — SIGNIFICANT CHANGE UP (ref 10–20)
CALCIUM SERPL-MCNC: 8.5 MG/DL — SIGNIFICANT CHANGE UP (ref 8.5–10.1)
CHLORIDE SERPL-SCNC: 102 MMOL/L — SIGNIFICANT CHANGE UP (ref 98–110)
CHOLEST SERPL-MCNC: 168 MG/DL — SIGNIFICANT CHANGE UP (ref 100–200)
CO2 SERPL-SCNC: 22 MMOL/L — SIGNIFICANT CHANGE UP (ref 17–32)
CREAT SERPL-MCNC: 0.8 MG/DL — SIGNIFICANT CHANGE UP (ref 0.7–1.5)
CRP SERPL-MCNC: 16.08 MG/DL — HIGH (ref 0–0.4)
EOSINOPHIL # BLD AUTO: 0.07 K/UL — SIGNIFICANT CHANGE UP (ref 0–0.7)
EOSINOPHIL NFR BLD AUTO: 0.6 % — SIGNIFICANT CHANGE UP (ref 0–8)
ERYTHROCYTE [SEDIMENTATION RATE] IN BLOOD: 63 MM/HR — HIGH (ref 0–10)
ESTIMATED AVERAGE GLUCOSE: 123 MG/DL — HIGH (ref 68–114)
GLUCOSE SERPL-MCNC: 123 MG/DL — HIGH (ref 70–99)
GRAM STN FLD: SIGNIFICANT CHANGE UP
HAV IGM SER-ACNC: SIGNIFICANT CHANGE UP
HBV CORE IGM SER-ACNC: SIGNIFICANT CHANGE UP
HBV SURFACE AG SER-ACNC: SIGNIFICANT CHANGE UP
HCT VFR BLD CALC: 40.3 % — LOW (ref 42–52)
HCV AB S/CO SERPL IA: 0.09 S/CO — SIGNIFICANT CHANGE UP (ref 0–0.99)
HCV AB SERPL-IMP: SIGNIFICANT CHANGE UP
HDLC SERPL-MCNC: 40 MG/DL — SIGNIFICANT CHANGE UP
HGB BLD-MCNC: 13.2 G/DL — LOW (ref 14–18)
IMM GRANULOCYTES NFR BLD AUTO: 0.4 % — HIGH (ref 0.1–0.3)
INR BLD: 1.15 RATIO — SIGNIFICANT CHANGE UP (ref 0.65–1.3)
LIPID PNL WITH DIRECT LDL SERPL: 99 MG/DL — SIGNIFICANT CHANGE UP (ref 4–129)
LYMPHOCYTES # BLD AUTO: 1.57 K/UL — SIGNIFICANT CHANGE UP (ref 1.2–3.4)
LYMPHOCYTES # BLD AUTO: 12.6 % — LOW (ref 20.5–51.1)
MAGNESIUM SERPL-MCNC: 1.8 MG/DL — SIGNIFICANT CHANGE UP (ref 1.8–2.4)
MCHC RBC-ENTMCNC: 27.6 PG — SIGNIFICANT CHANGE UP (ref 27–31)
MCHC RBC-ENTMCNC: 32.8 G/DL — SIGNIFICANT CHANGE UP (ref 32–37)
MCV RBC AUTO: 84.1 FL — SIGNIFICANT CHANGE UP (ref 80–94)
METHOD TYPE: SIGNIFICANT CHANGE UP
MONOCYTES # BLD AUTO: 1.16 K/UL — HIGH (ref 0.1–0.6)
MONOCYTES NFR BLD AUTO: 9.3 % — SIGNIFICANT CHANGE UP (ref 1.7–9.3)
NEUTROPHILS # BLD AUTO: 9.59 K/UL — HIGH (ref 1.4–6.5)
NEUTROPHILS NFR BLD AUTO: 76.9 % — HIGH (ref 42.2–75.2)
NRBC # BLD: 0 /100 WBCS — SIGNIFICANT CHANGE UP (ref 0–0)
PLATELET # BLD AUTO: 203 K/UL — SIGNIFICANT CHANGE UP (ref 130–400)
POTASSIUM SERPL-MCNC: 3.7 MMOL/L — SIGNIFICANT CHANGE UP (ref 3.5–5)
POTASSIUM SERPL-SCNC: 3.7 MMOL/L — SIGNIFICANT CHANGE UP (ref 3.5–5)
PROT SERPL-MCNC: 6.7 G/DL — SIGNIFICANT CHANGE UP (ref 6–8)
PROTHROM AB SERPL-ACNC: 13.2 SEC — HIGH (ref 9.95–12.87)
RBC # BLD: 4.79 M/UL — SIGNIFICANT CHANGE UP (ref 4.7–6.1)
RBC # FLD: 14.1 % — SIGNIFICANT CHANGE UP (ref 11.5–14.5)
SARS-COV-2 IGG SERPL QL IA: NEGATIVE — SIGNIFICANT CHANGE UP
SARS-COV-2 IGM SERPL IA-ACNC: <3.8 AU/ML — SIGNIFICANT CHANGE UP
SODIUM SERPL-SCNC: 136 MMOL/L — SIGNIFICANT CHANGE UP (ref 135–146)
SPECIMEN SOURCE: SIGNIFICANT CHANGE UP
TOTAL CHOLESTEROL/HDL RATIO MEASUREMENT: 4.2 RATIO — SIGNIFICANT CHANGE UP (ref 4–5.5)
TRIGL SERPL-MCNC: 111 MG/DL — SIGNIFICANT CHANGE UP (ref 10–149)
TSH SERPL-MCNC: 2.28 UIU/ML — SIGNIFICANT CHANGE UP (ref 0.27–4.2)
WBC # BLD: 12.47 K/UL — HIGH (ref 4.8–10.8)
WBC # FLD AUTO: 12.47 K/UL — HIGH (ref 4.8–10.8)

## 2020-10-12 PROCEDURE — 99233 SBSQ HOSP IP/OBS HIGH 50: CPT

## 2020-10-12 PROCEDURE — 76705 ECHO EXAM OF ABDOMEN: CPT | Mod: 26

## 2020-10-12 PROCEDURE — 31231 NASAL ENDOSCOPY DX: CPT

## 2020-10-12 PROCEDURE — 99223 1ST HOSP IP/OBS HIGH 75: CPT | Mod: 25

## 2020-10-12 PROCEDURE — 88189 FLOWCYTOMETRY/READ 16 & >: CPT

## 2020-10-12 RX ORDER — MORPHINE SULFATE 50 MG/1
6 CAPSULE, EXTENDED RELEASE ORAL EVERY 6 HOURS
Refills: 0 | Status: DISCONTINUED | OUTPATIENT
Start: 2020-10-12 | End: 2020-10-13

## 2020-10-12 RX ORDER — POLYETHYLENE GLYCOL 3350 17 G/17G
17 POWDER, FOR SOLUTION ORAL DAILY
Refills: 0 | Status: DISCONTINUED | OUTPATIENT
Start: 2020-10-12 | End: 2020-10-15

## 2020-10-12 RX ORDER — MORPHINE SULFATE 50 MG/1
4 CAPSULE, EXTENDED RELEASE ORAL ONCE
Refills: 0 | Status: DISCONTINUED | OUTPATIENT
Start: 2020-10-12 | End: 2020-10-12

## 2020-10-12 RX ORDER — MORPHINE SULFATE 50 MG/1
2 CAPSULE, EXTENDED RELEASE ORAL EVERY 6 HOURS
Refills: 0 | Status: DISCONTINUED | OUTPATIENT
Start: 2020-10-12 | End: 2020-10-12

## 2020-10-12 RX ORDER — SODIUM CHLORIDE 9 MG/ML
1000 INJECTION, SOLUTION INTRAVENOUS
Refills: 0 | Status: DISCONTINUED | OUTPATIENT
Start: 2020-10-12 | End: 2020-10-13

## 2020-10-12 RX ORDER — IPRATROPIUM/ALBUTEROL SULFATE 18-103MCG
3 AEROSOL WITH ADAPTER (GRAM) INHALATION EVERY 6 HOURS
Refills: 0 | Status: DISCONTINUED | OUTPATIENT
Start: 2020-10-12 | End: 2020-10-15

## 2020-10-12 RX ORDER — SENNA PLUS 8.6 MG/1
2 TABLET ORAL AT BEDTIME
Refills: 0 | Status: DISCONTINUED | OUTPATIENT
Start: 2020-10-12 | End: 2020-10-15

## 2020-10-12 RX ADMIN — MORPHINE SULFATE 4 MILLIGRAM(S): 50 CAPSULE, EXTENDED RELEASE ORAL at 12:15

## 2020-10-12 RX ADMIN — AMLODIPINE BESYLATE 2.5 MILLIGRAM(S): 2.5 TABLET ORAL at 05:21

## 2020-10-12 RX ADMIN — ENOXAPARIN SODIUM 40 MILLIGRAM(S): 100 INJECTION SUBCUTANEOUS at 12:20

## 2020-10-12 RX ADMIN — SODIUM CHLORIDE 100 MILLILITER(S): 9 INJECTION, SOLUTION INTRAVENOUS at 10:00

## 2020-10-12 RX ADMIN — MORPHINE SULFATE 6 MILLIGRAM(S): 50 CAPSULE, EXTENDED RELEASE ORAL at 19:04

## 2020-10-12 RX ADMIN — MORPHINE SULFATE 6 MILLIGRAM(S): 50 CAPSULE, EXTENDED RELEASE ORAL at 14:14

## 2020-10-12 RX ADMIN — LISINOPRIL 5 MILLIGRAM(S): 2.5 TABLET ORAL at 05:21

## 2020-10-12 RX ADMIN — MORPHINE SULFATE 4 MILLIGRAM(S): 50 CAPSULE, EXTENDED RELEASE ORAL at 10:45

## 2020-10-12 RX ADMIN — Medication 650 MILLIGRAM(S): at 03:14

## 2020-10-12 RX ADMIN — AMPICILLIN SODIUM AND SULBACTAM SODIUM 200 GRAM(S): 250; 125 INJECTION, POWDER, FOR SUSPENSION INTRAMUSCULAR; INTRAVENOUS at 17:26

## 2020-10-12 RX ADMIN — AMPICILLIN SODIUM AND SULBACTAM SODIUM 200 GRAM(S): 250; 125 INJECTION, POWDER, FOR SUSPENSION INTRAMUSCULAR; INTRAVENOUS at 07:38

## 2020-10-12 RX ADMIN — MORPHINE SULFATE 2 MILLIGRAM(S): 50 CAPSULE, EXTENDED RELEASE ORAL at 08:17

## 2020-10-12 RX ADMIN — OXYCODONE AND ACETAMINOPHEN 2 TABLET(S): 5; 325 TABLET ORAL at 01:09

## 2020-10-12 RX ADMIN — Medication 3 MILLILITER(S): at 19:58

## 2020-10-12 RX ADMIN — MORPHINE SULFATE 6 MILLIGRAM(S): 50 CAPSULE, EXTENDED RELEASE ORAL at 14:20

## 2020-10-12 RX ADMIN — OXYCODONE AND ACETAMINOPHEN 2 TABLET(S): 5; 325 TABLET ORAL at 00:39

## 2020-10-12 RX ADMIN — AMPICILLIN SODIUM AND SULBACTAM SODIUM 200 GRAM(S): 250; 125 INJECTION, POWDER, FOR SUSPENSION INTRAMUSCULAR; INTRAVENOUS at 12:20

## 2020-10-12 RX ADMIN — AMPICILLIN SODIUM AND SULBACTAM SODIUM 200 GRAM(S): 250; 125 INJECTION, POWDER, FOR SUSPENSION INTRAMUSCULAR; INTRAVENOUS at 03:10

## 2020-10-12 RX ADMIN — MORPHINE SULFATE 2 MILLIGRAM(S): 50 CAPSULE, EXTENDED RELEASE ORAL at 08:04

## 2020-10-12 RX ADMIN — MORPHINE SULFATE 6 MILLIGRAM(S): 50 CAPSULE, EXTENDED RELEASE ORAL at 19:19

## 2020-10-12 RX ADMIN — SENNA PLUS 2 TABLET(S): 8.6 TABLET ORAL at 21:45

## 2020-10-12 RX ADMIN — Medication 650 MILLIGRAM(S): at 03:44

## 2020-10-12 NOTE — PROGRESS NOTE ADULT - ASSESSMENT
46yo M c PMHx HTN, SHANELL on qhs NIPPV, active smoker, morbid obesity here with sepsis poa 2/2 dental abscess r/o nasopharyngeal neoplasm, transaminitis    d/w ENT-> will undergo bedside scope  MRI will have to be outpatient (open) 2/2 body habitus  c/w IV abx per ID recommendation until edema improves substantially  dental/omfs attempted bedside drainage without significant output- may need further attempts at drainage to achieve more optimal source control   NPO for now  check ruq us  +duonebs for wheezing, patient declines any need for a nicotine patch right now  smoking cessation counselling and support provided  weight loss/ diet exercise encouraged        #Progress Note Handoff    Pending (specify):  iv abx for now    Family discussion: plan of care discussed with patient who is completely alert and oriented    Disposition: acute

## 2020-10-12 NOTE — CDI QUERY NOTE - NSCDIOTHERTXTBX_GEN_ALL_CORE_HH
Dr. Elizabeth,    44 y/o male admitted with facial swelling, dental abscess s/p dental procedure.    Patient is noted to have a Body Mass Index of 68.3.  Please specify the diagnosis associated with this finding.    -Morbid obesity  -Other (please specify)  -Undbale to determine.    Thank you   Anne Douglass  [facility]  [email or web address]

## 2020-10-12 NOTE — PROGRESS NOTE ADULT - ASSESSMENT
45 years old Male with PMH of HTN, SHANELL on Bipap, h/o cellulitis, h/o amblyopia to Right eye comes in due to left facial swelling and pain s/p dental procedure.    # Submandibular / Submaxillary space infection / Dental abscess  - CT maxillofacial shows Significant soft tissue edema overlying the left maxilla and mandible with the nonenhancing fluid collection consistent with phlegmonous changes. Prominence of the nasopharyngeal soft tissues and almost complete opacification of the mastoid air cells, with possibility of a neoplasm arising in the nasopharynx.   - MRSA negative  - F/u BCx  - MRI recommended by ENT could not be done due to body habitus  - C/w Unasyn 3g IV q6  - Strict NPO, on IV fluids  - F/u OMFS and dental  - F/u ID    # Transaminitis  - F/u US abdomen  - Monitor LFTs    # HTN  - started amlodipine 2.5mg qd and lisinopril 5mg PO qd. Continue    # SHANELL   - f/u out patient pulmonary, CPAP setting 17-21    # Diet: NPO strict  # DVT Prophylaxis: Lovenox  # Activity: IAT  # Dispo: Acute   # Code Status: Full code 45 years old Male with PMH of HTN, SHANELL on Bipap, h/o cellulitis, h/o amblyopia to Right eye comes in due to left facial swelling and pain s/p dental procedure.    # Submandibular / Submaxillary space infection / Dental abscess  - CT maxillofacial shows Significant soft tissue edema overlying the left maxilla and mandible with the nonenhancing fluid collection consistent with phlegmonous changes. Prominence of the nasopharyngeal soft tissues and almost complete opacification of the mastoid air cells, with possibility of a neoplasm arising in the nasopharynx.   - MRSA negative  - F/u BCx  - MRI recommended by ENT could not be done due to body habitus - patient needs F/u MRI outpatient in an open MRI  - C/w Unasyn 3g IV q6  - Strict NPO, on IV fluids  - F/u OMFS and dental  - F/u ID  - ENT will scope. F/u ENT    # Transaminitis  - F/u US abdomen  - Monitor LFTs    # HTN  - started amlodipine 2.5mg qd and lisinopril 5mg PO qd. Continue    # SHANELL   - f/u out patient pulmonary, CPAP setting 17-21    # Diet: NPO strict  # DVT Prophylaxis: Lovenox  # Activity: IAT  # Dispo: Acute   # Code Status: Full code 45 years old Male with PMH of HTN, SHANELL on Bipap, h/o cellulitis, h/o amblyopia to Right eye comes in due to left facial swelling and pain s/p dental procedure.    # Submandibular / Submaxillary space infection / Dental abscess  - CT maxillofacial shows Significant soft tissue edema overlying the left maxilla and mandible with the nonenhancing fluid collection consistent with phlegmonous changes. Prominence of the nasopharyngeal soft tissues and almost complete opacification of the mastoid air cells, with possibility of a neoplasm arising in the nasopharynx.   - MRSA negative  - F/u BCx  - MRI recommended by ENT could not be done due to habitus - patient needs F/u MRI outpatient in an open MRI  - C/w Unasyn 3g IV q6  - Strict NPO, on IV fluids  - F/u OMFS and dental  - F/u ID  - ENT will scope. F/u ENT    # Transaminitis  - F/u US abdomen  - Monitor LFTs    # HTN  - started amlodipine 2.5mg qd and lisinopril 5mg PO qd. Continue    # SHANELL   - F/u outpatient pulmonary. Saw Dr. Parra in the past  - Uses BiPAP at home to sleep; unclear what home settings are, will start BiPAP 10-5, can be titrated    # Diet: NPO strict  # DVT Prophylaxis: Lovenox  # Activity: IAT  # Dispo: Acute   # Code Status: Full code 45 years old Male with PMH of HTN, SHANELL on Bipap, h/o cellulitis, h/o amblyopia to Right eye comes in due to left facial swelling and pain s/p dental procedure.    # Submandibular / Submaxillary space infection / Dental abscess  - CT maxillofacial shows Significant soft tissue edema overlying the left maxilla and mandible with the nonenhancing fluid collection consistent with phlegmonous changes. Prominence of the nasopharyngeal soft tissues and almost complete opacification of the mastoid air cells, with possibility of a neoplasm arising in the nasopharynx.   - MRSA negative  - F/u BCx  - MRI recommended by ENT could not be done due to habitus - patient needs F/u MRI outpatient in an open MRI  - C/w Unasyn 3g IV q6  - Strict NPO, on IV fluids  - F/u OMFS and dental  - F/u ID  - ENT will scope. F/u ENT  - F/u EBV serology and flow cytometry for lymphoma.    # Transaminitis  - F/u US abdomen  - Monitor LFTs    # HTN  - started amlodipine 2.5mg qd and lisinopril 5mg PO qd. Continue    # SHANELL   - F/u outpatient pulmonary. Saw Dr. Parra in the past  - Uses BiPAP at home to sleep; unclear what home settings are, will start BiPAP 10-5, can be titrated    # Diet: NPO strict  # DVT Prophylaxis: Lovenox  # Activity: IAT  # Dispo: Acute   # Code Status: Full code

## 2020-10-12 NOTE — PROGRESS NOTE ADULT - SUBJECTIVE AND OBJECTIVE BOX
24 hour events: pt admitted for buccal space infection likely of odontogenic etiology. also has incidental findings concerning for NP carcinoma. febrile on admission, now afebrile    O:  Vitals as per chart, reviewed; afebrile  Labs as per chart, reviewed; WBC 24 hour events: pt admitted for buccal space infection likely of odontogenic etiology. also has incidental findings concerning for NP carcinoma. febrile on admission, now afebrile    O:  Vitals as per chart, reviewed; afebrile  Labs as per chart, reviewed; WBC 12.47; uptrending    Relevant PE:  Gen: AAOx3, NAD  HEENT:   L facial/cheek/submandibular swelling w overylying erythema  EOMI, PERRLA  No otorrhea  No rhinorrhea  T: Unable to visualize uvula  Oral: trismus to ~25 mm, palpable fluctuance adjacent to #14, no fluctuance palpated in L buccal mucosa 24 hour events: pt admitted for buccal space infection likely of odontogenic etiology. also has incidental findings concerning for NP carcinoma. febrile on admission, now afebrile    O:  Vitals as per chart, reviewed; afebrile  Labs as per chart, reviewed; WBC 12.47; uptrending    Relevant PE:  Gen: AAOx3, NAD  HEENT:   L facial/cheek/submandibular swelling w overylying erythema  EOMI, PERRLA  No otorrhea  No rhinorrhea  T: Unable to visualize uvula  Oral: trismus to ~25 mm, palpable fluctuance adjacent to #14, no fluctuance palpated in L buccal mucosa; no spontaneous drainage; no grossly carious teeth    No new imaging since initial scan    Procedure: I/d L maxillary buccal vestibule, L buccal mucosa  Anesthetized w 3cc 2% lidocaine w 1:100,000 epinephrine  i/d performed in buccal vestibule adjacent to #14 and L buccal vestibule  minimal purulence attained, mainly serosanguinous  irrigated and explored incision sites w hemostat  Hemostasis attained    AP:  45M w buccal space infection, elevated WBC, likely of odontogenic etiology    -c/w unasyn  -pain control as per primary team  -trend labs  -rest of care as per primary team  -please ensure pt is NPO and on IVF for possible procedure  -OMFS will follow while in house    Froylan Ellsworth DMD, MD, PGY3    d/w Dr. Lovell

## 2020-10-12 NOTE — PROGRESS NOTE ADULT - SUBJECTIVE AND OBJECTIVE BOX
BELEN CASTILLO  45y  Male      Patient is a 45y old  Male who presents with a chief complaint of Facial swelling (12 Oct 2020 09:32)      INTERVAL HPI/OVERNIGHT EVENTS: reports ongoing L sided facial swelling but no visual change or trouble breathing or swallowing. no drainage      REVIEW OF SYSTEMS:  as above  All other review of systems negative    T(C): 37.5 (10-12-20 @ 15:15), Max: 37.5 (10-12-20 @ 15:15)  HR: 91 (10-12-20 @ 15:15) (80 - 91)  BP: 140/81 (10-12-20 @ 10:22) (103/56 - 179/94)  RR: 19 (10-12-20 @ 15:15) (18 - 19)  SpO2: 98% (10-12-20 @ 05:00) (98% - 99%)  Wt(kg): --Vital Signs Last 24 Hrs  T(C): 37.5 (12 Oct 2020 15:15), Max: 37.5 (12 Oct 2020 15:15)  T(F): 99.5 (12 Oct 2020 15:15), Max: 99.5 (12 Oct 2020 15:15)  HR: 91 (12 Oct 2020 15:15) (80 - 91)  BP: 140/81 (12 Oct 2020 10:22) (103/56 - 179/94)  BP(mean): --  RR: 19 (12 Oct 2020 15:15) (18 - 19)  SpO2: 98% (12 Oct 2020 05:00) (98% - 99%)        PHYSICAL EXAM:  GENERAL: NAD  HEENT thick neck, L sided mandibular and maxillary induration and subtle warmth  PSYCH: no agitation, baseline mentation  NERVOUS SYSTEM:  Alert & Oriented X3, no new focal deficits  PULMONARY: subtle expiratory wheezing, comfortable on RA  CARDIOVASCULAR: Regular rate and rhythm; No murmurs, rubs, or gallops  GI: Soft, Nontender, Nondistended; Bowel sounds present morbidly obese  EXTREMITIES:  2+ Peripheral Pulses, No clubbing, cyanosis, or edema    Consultant(s) Notes Reviewed:  [x ] YES  [ ] NO    Discussed with Consultants/Other Providers [ x] YES     LABS                          13.2   12.47 )-----------( 203      ( 12 Oct 2020 07:07 )             40.3     10-12    136  |  102  |  10  ----------------------------<  123<H>  3.7   |  22  |  0.8    Ca    8.5      12 Oct 2020 07:07  Mg     1.8     10-12    TPro  6.7  /  Alb  3.7  /  TBili  1.2  /  DBili  x   /  AST  77<H>  /  ALT  149<H>  /  AlkPhos  121<H>  10-12        PT/INR - ( 12 Oct 2020 07:07 )   PT: 13.20 sec;   INR: 1.15 ratio         PTT - ( 12 Oct 2020 07:07 )  PTT:30.0 sec  Lactate Trend  10-11 @ 13:02 Lactate:1.2         CAPILLARY BLOOD GLUCOSE            RADIOLOGY & ADDITIONAL TESTS:    Imaging Personally Reviewed:  [ ] YES  [ ] NO    HEALTH ISSUES - PROBLEM Dx:

## 2020-10-12 NOTE — PROGRESS NOTE ADULT - SUBJECTIVE AND OBJECTIVE BOX
SUBJECTIVE:    Patient is a 45y old Male who presents with a chief complaint of Facial swelling (12 Oct 2020 09:01). Still having facial pain this morning. No other complaints. Bedside drainage attempted by OMFS this morning, only drained minimal serosanguinous fluid. Will keep NPO on IVF.      HPI:  45 years old Male with PMH of HTN, SHANELL on Bipap, h/o cellulitis, h/o amblyopia to Right eye comes in due to left facial swelling and pain s/p dental procedure.  History of presenting illness goes back to 2 weeks ago when patient went to see his dentist for tooth pain. Pt got a dental filling and was scheduled for root canal on November 1. The pain worsened over the course of time and patient had to visit back dentist of a refilling on Friday. Pt was put on PO amoxicillin but pain worsened and patient started experiencing left sided facial swelling on Friday night accompanied with severe pain. Last night patient experienced fever of 102F and was brought into [facility] ED south. Pt was diagnosed with dental abscess and was referred to [place] for dental evaluation.    Pt. denies active purulent drainage from mouth, otalgia, loss of hearing, vision loss or difficulty breathing.     In the ED, BP was 168/105, , Temp 101.8, RR 20, saturating well on RA. Labs are significant for leukocytosis at 11.70, elevated LFTs bilirubin 1.6, AST 88, , . CT maxillofacial shows Significant soft tissue edema overlying the left maxilla and mandible with the nonenhancing fluid collection consistent with phlegmonous changes. Prominence of the nasopharyngeal soft tissues and almost complete opacification of the mastoid air cells, with possibility of a neoplasm arising in the nasopharynx. Pt received 1L LR bolus in ED and Unasyn started. ENT was consulted MRI recommended. Pt to be admitted under medicine for further workup.    (11 Oct 2020 18:29)      Currently admitted to medicine with the primary diagnosis of Dental infection         Besides the pertinent positives and negatives described above, the ROS was within normal limits.    PAST MEDICAL & SURGICAL HISTORY  Sleep apnea    HTN (hypertension)    No significant past surgical history      SOCIAL HISTORY:    ALLERGIES:  No Known Allergies    MEDICATIONS:  STANDING MEDICATIONS  amLODIPine   Tablet 2.5 milliGRAM(s) Oral daily  ampicillin/sulbactam  IVPB      ampicillin/sulbactam  IVPB 3 Gram(s) IV Intermittent every 6 hours  chlorhexidine 4% Liquid 1 Application(s) Topical <User Schedule>  enoxaparin Injectable 40 milliGRAM(s) SubCutaneous daily  influenza   Vaccine 0.5 milliLiter(s) IntraMuscular once  lactated ringers. 1000 milliLiter(s) IV Continuous <Continuous>  lisinopril 5 milliGRAM(s) Oral daily  polyethylene glycol 3350 17 Gram(s) Oral daily  senna 2 Tablet(s) Oral at bedtime    PRN MEDICATIONS  acetaminophen   Tablet .. 650 milliGRAM(s) Oral every 6 hours PRN  morphine  - Injectable 2 milliGRAM(s) IV Push every 6 hours PRN  oxycodone    5 mG/acetaminophen 325 mG 2 Tablet(s) Oral every 6 hours PRN    VITALS:   Vital Signs Last 24 Hrs  T(C): 36.6 (12 Oct 2020 05:59), Max: 38.8 (11 Oct 2020 12:34)  T(F): 97.9 (12 Oct 2020 05:59), Max: 101.8 (11 Oct 2020 12:34)  HR: 87 (12 Oct 2020 05:59) (80 - 101)  BP: 136/87 (12 Oct 2020 05:59) (103/56 - 179/94)  BP(mean): --  RR: 18 (12 Oct 2020 05:59) (18 - 20)  SpO2: 98% (12 Oct 2020 05:00) (96% - 99%)    LABS:                        13.2   12.47 )-----------( 203      ( 12 Oct 2020 07:07 )             40.3     10-12    136  |  102  |  10  ----------------------------<  123<H>  3.7   |  22  |  0.8    Ca    8.5      12 Oct 2020 07:07  Mg     1.8     10-12    TPro  6.7  /  Alb  3.7  /  TBili  1.2  /  DBili  x   /  AST  77<H>  /  ALT  149<H>  /  AlkPhos  121<H>  10-12    PT/INR - ( 12 Oct 2020 07:07 )   PT: 13.20 sec;   INR: 1.15 ratio         PTT - ( 12 Oct 2020 07:07 )  PTT:30.0 sec      Sedimentation Rate, Erythrocyte: 63 mm/Hr <H> (10-12-20 @ 07:07)  Lactate, Blood: 1.2 mmol/L (10-11-20 @ 13:02)          RADIOLOGY:    < from: CT Maxillofacial w/ IV Cont (10.11.20 @ 14:26) >  IMPRESSION:    1.  Significant soft tissue edema overlying the left maxilla and mandible with the nonenhancing fluid collection consistent with phlegmonous changes.    2.  Prominence of the nasopharyngeal soft tissues and almost complete opacification of the mastoid air cells, the possibility of a neoplasm arising in the nasopharynx should be considered.    < end of copied text >      PHYSICAL EXAM:  CONSTITUTIONAL: In no acute distress  NECK: Supple; non tender. No rigidity  HEENT: Left facial swelling  CARD: Regular rate and rhythm. Normal S1, S2.  RESP: Lungs clear to auscultation bilaterally.   ABD: Abdomen soft; non-tender; non-distended; no hepatosplenomegaly.  EXT: Normal ROM. No edema.  NEUROLOGY: Non-focal.  PSYCH: Cooperative, appropriate.

## 2020-10-13 LAB
ALBUMIN SERPL ELPH-MCNC: 3.5 G/DL — SIGNIFICANT CHANGE UP (ref 3.5–5.2)
ALP SERPL-CCNC: 127 U/L — HIGH (ref 30–115)
ALT FLD-CCNC: 112 U/L — HIGH (ref 0–41)
ANION GAP SERPL CALC-SCNC: 13 MMOL/L — SIGNIFICANT CHANGE UP (ref 7–14)
AST SERPL-CCNC: 42 U/L — HIGH (ref 0–41)
BILIRUB SERPL-MCNC: 1 MG/DL — SIGNIFICANT CHANGE UP (ref 0.2–1.2)
BLD GP AB SCN SERPL QL: SIGNIFICANT CHANGE UP
BUN SERPL-MCNC: 9 MG/DL — LOW (ref 10–20)
CALCIUM SERPL-MCNC: 8.7 MG/DL — SIGNIFICANT CHANGE UP (ref 8.5–10.1)
CHLORIDE SERPL-SCNC: 100 MMOL/L — SIGNIFICANT CHANGE UP (ref 98–110)
CO2 SERPL-SCNC: 23 MMOL/L — SIGNIFICANT CHANGE UP (ref 17–32)
CREAT SERPL-MCNC: 0.8 MG/DL — SIGNIFICANT CHANGE UP (ref 0.7–1.5)
CULTURE RESULTS: SIGNIFICANT CHANGE UP
EBV EA AB SER IA-ACNC: >150 U/ML — HIGH
EBV EA AB TITR SER IF: POSITIVE
EBV EA IGG SER-ACNC: POSITIVE
EBV NA IGG SER IA-ACNC: 561 U/ML — HIGH
EBV PATRN SPEC IB-IMP: SIGNIFICANT CHANGE UP
EBV VCA IGG AVIDITY SER QL IA: POSITIVE
EBV VCA IGM SER IA-ACNC: <10 U/ML — SIGNIFICANT CHANGE UP
EBV VCA IGM SER IA-ACNC: >750 U/ML — HIGH
EBV VCA IGM TITR FLD: NEGATIVE — SIGNIFICANT CHANGE UP
GLUCOSE SERPL-MCNC: 108 MG/DL — HIGH (ref 70–99)
HCT VFR BLD CALC: 38.2 % — LOW (ref 42–52)
HGB BLD-MCNC: 12.7 G/DL — LOW (ref 14–18)
MCHC RBC-ENTMCNC: 28.1 PG — SIGNIFICANT CHANGE UP (ref 27–31)
MCHC RBC-ENTMCNC: 33.2 G/DL — SIGNIFICANT CHANGE UP (ref 32–37)
MCV RBC AUTO: 84.5 FL — SIGNIFICANT CHANGE UP (ref 80–94)
NRBC # BLD: 0 /100 WBCS — SIGNIFICANT CHANGE UP (ref 0–0)
ORGANISM # SPEC MICROSCOPIC CNT: SIGNIFICANT CHANGE UP
ORGANISM # SPEC MICROSCOPIC CNT: SIGNIFICANT CHANGE UP
PLATELET # BLD AUTO: 205 K/UL — SIGNIFICANT CHANGE UP (ref 130–400)
POTASSIUM SERPL-MCNC: 3.7 MMOL/L — SIGNIFICANT CHANGE UP (ref 3.5–5)
POTASSIUM SERPL-SCNC: 3.7 MMOL/L — SIGNIFICANT CHANGE UP (ref 3.5–5)
PROT SERPL-MCNC: 6.6 G/DL — SIGNIFICANT CHANGE UP (ref 6–8)
RBC # BLD: 4.52 M/UL — LOW (ref 4.7–6.1)
RBC # FLD: 14.1 % — SIGNIFICANT CHANGE UP (ref 11.5–14.5)
SODIUM SERPL-SCNC: 136 MMOL/L — SIGNIFICANT CHANGE UP (ref 135–146)
SPECIMEN SOURCE: SIGNIFICANT CHANGE UP
TM INTERPRETATION: SIGNIFICANT CHANGE UP
WBC # BLD: 10.71 K/UL — SIGNIFICANT CHANGE UP (ref 4.8–10.8)
WBC # FLD AUTO: 10.71 K/UL — SIGNIFICANT CHANGE UP (ref 4.8–10.8)

## 2020-10-13 PROCEDURE — 99233 SBSQ HOSP IP/OBS HIGH 50: CPT

## 2020-10-13 PROCEDURE — 71045 X-RAY EXAM CHEST 1 VIEW: CPT | Mod: 26

## 2020-10-13 RX ORDER — MORPHINE SULFATE 50 MG/1
6 CAPSULE, EXTENDED RELEASE ORAL EVERY 6 HOURS
Refills: 0 | Status: DISCONTINUED | OUTPATIENT
Start: 2020-10-13 | End: 2020-10-13

## 2020-10-13 RX ORDER — MORPHINE SULFATE 50 MG/1
6 CAPSULE, EXTENDED RELEASE ORAL EVERY 6 HOURS
Refills: 0 | Status: DISCONTINUED | OUTPATIENT
Start: 2020-10-13 | End: 2020-10-15

## 2020-10-13 RX ORDER — SODIUM CHLORIDE 9 MG/ML
1000 INJECTION, SOLUTION INTRAVENOUS
Refills: 0 | Status: DISCONTINUED | OUTPATIENT
Start: 2020-10-13 | End: 2020-10-15

## 2020-10-13 RX ADMIN — AMPICILLIN SODIUM AND SULBACTAM SODIUM 200 GRAM(S): 250; 125 INJECTION, POWDER, FOR SUSPENSION INTRAMUSCULAR; INTRAVENOUS at 00:10

## 2020-10-13 RX ADMIN — ENOXAPARIN SODIUM 40 MILLIGRAM(S): 100 INJECTION SUBCUTANEOUS at 11:30

## 2020-10-13 RX ADMIN — POLYETHYLENE GLYCOL 3350 17 GRAM(S): 17 POWDER, FOR SOLUTION ORAL at 11:33

## 2020-10-13 RX ADMIN — AMLODIPINE BESYLATE 2.5 MILLIGRAM(S): 2.5 TABLET ORAL at 05:04

## 2020-10-13 RX ADMIN — MORPHINE SULFATE 6 MILLIGRAM(S): 50 CAPSULE, EXTENDED RELEASE ORAL at 11:29

## 2020-10-13 RX ADMIN — Medication 100 MILLIGRAM(S): at 14:50

## 2020-10-13 RX ADMIN — OXYCODONE AND ACETAMINOPHEN 2 TABLET(S): 5; 325 TABLET ORAL at 22:52

## 2020-10-13 RX ADMIN — MORPHINE SULFATE 6 MILLIGRAM(S): 50 CAPSULE, EXTENDED RELEASE ORAL at 02:27

## 2020-10-13 RX ADMIN — MORPHINE SULFATE 6 MILLIGRAM(S): 50 CAPSULE, EXTENDED RELEASE ORAL at 11:33

## 2020-10-13 RX ADMIN — SENNA PLUS 2 TABLET(S): 8.6 TABLET ORAL at 21:48

## 2020-10-13 RX ADMIN — Medication 100 MILLIGRAM(S): at 21:48

## 2020-10-13 RX ADMIN — AMPICILLIN SODIUM AND SULBACTAM SODIUM 200 GRAM(S): 250; 125 INJECTION, POWDER, FOR SUSPENSION INTRAMUSCULAR; INTRAVENOUS at 23:50

## 2020-10-13 RX ADMIN — LISINOPRIL 5 MILLIGRAM(S): 2.5 TABLET ORAL at 05:04

## 2020-10-13 RX ADMIN — AMPICILLIN SODIUM AND SULBACTAM SODIUM 200 GRAM(S): 250; 125 INJECTION, POWDER, FOR SUSPENSION INTRAMUSCULAR; INTRAVENOUS at 11:30

## 2020-10-13 RX ADMIN — Medication 3 MILLILITER(S): at 19:16

## 2020-10-13 RX ADMIN — SODIUM CHLORIDE 100 MILLILITER(S): 9 INJECTION, SOLUTION INTRAVENOUS at 14:01

## 2020-10-13 RX ADMIN — AMPICILLIN SODIUM AND SULBACTAM SODIUM 200 GRAM(S): 250; 125 INJECTION, POWDER, FOR SUSPENSION INTRAMUSCULAR; INTRAVENOUS at 05:03

## 2020-10-13 RX ADMIN — OXYCODONE AND ACETAMINOPHEN 2 TABLET(S): 5; 325 TABLET ORAL at 15:45

## 2020-10-13 RX ADMIN — MORPHINE SULFATE 6 MILLIGRAM(S): 50 CAPSULE, EXTENDED RELEASE ORAL at 02:26

## 2020-10-13 RX ADMIN — SODIUM CHLORIDE 100 MILLILITER(S): 9 INJECTION, SOLUTION INTRAVENOUS at 00:10

## 2020-10-13 RX ADMIN — AMPICILLIN SODIUM AND SULBACTAM SODIUM 200 GRAM(S): 250; 125 INJECTION, POWDER, FOR SUSPENSION INTRAMUSCULAR; INTRAVENOUS at 17:39

## 2020-10-13 NOTE — PROGRESS NOTE ADULT - ATTENDING COMMENTS
Patient seen and examined independently of resident. My addendum supersedes resident notation. Case discussed with housestaff, nursing and patient
patient seen and examined , agree with pgy 3 assesment and plan except as indicated above,   44 YO M with a PMH of HTN, SHANELL on Bipap, h/o cellulitis, and h/o amblyopia to Right eye who presents to the hospital with a c/o left facial swelling s/p dental procedure comes in due to left facial swelling and pain s/p dental procedure on 10/9 for a temporary filling. Associated with pain and fevers. Denies any difficulty with swallowing or breathing  GEN Lying in no acute distress  HEENT Pupils equal and reactive to light and accommodationSupple Neck  PULM Clear to auscultation bilaterally  CV s1s2 regular rate and rhythm  GI + bowel sounds nontender obese  EXT no cyanosis or edema  PSYCH awake alert and oriented x 3  INTEG No Lesions  NEURO Moves all extremities  #Morbid obesity BMI 68 patient needs to see dieitian outpatient for further evaluation   #Prediabetes- diet modification , dietitian referral   #Cholelithiasis - assymptomatic no intervention  #Anemia no indication for transfusion   Progress Note Handoff    Pending:  omfs , id following , cw abx and awaiting cultures    Family discussion: patient verbalized understanding and agreeable to plan of care     Disposition: Home___

## 2020-10-13 NOTE — PROGRESS NOTE ADULT - ASSESSMENT
45 years old Male with PMH of HTN, SHANELL on Bipap, h/o cellulitis, h/o amblyopia to Right eye comes in due to left facial swelling and pain s/p dental procedure.    # Submandibular / Submaxillary space infection / Dental abscess  - CT maxillofacial shows Significant soft tissue edema overlying the left maxilla and mandible with the nonenhancing fluid collection consistent with phlegmonous changes. Prominence of the nasopharyngeal soft tissues and almost complete opacification of the mastoid air cells, with possibility of a neoplasm arising in the nasopharynx.   - MRSA negative  - pain control with tylenol, oxycodone and morphine PRN   - BCx positive for gram positive cocci in clusters   - MRI recommended by ENT could not be done due to habitus - patient needs F/u MRI outpatient in an open MRI  - C/w Unasyn 3g IV q6  - Strict NPO, on IV fluids  - OMFS did I&D 10/12: minimal purulence attained, mainly serosanguinous  - dental consult appreciated, pt will f/u outpt   - F/u ID  - ENT will scope today. F/u ENT  - EBV positive, f/u flow cytometry for lymphoma    # Wheezing  - pt is a current smoker, 1PPD for 20 years  - c/w duonebs     # Transaminitis  - US abdomen shows hepatic steatosis and cholelithiasis  - likely fatty liver secondary to morbid obesity   - Monitor LFTs  - f/u outpt with PCP    # HTN  - c/w amlodipine 2.5mg qd and lisinopril 5mg PO qd    # SHANELL   - F/u outpatient pulmonary. Saw Dr. Parra in the past  - Uses BiPAP at home to sleep; unclear what home settings are, started on BiPAP 10-5, can be titrated. Pt states he is comfortable    # morbid obesity  - BMI 68.3  - weight loss encouraged     # Diet: NPO strict  # DVT Prophylaxis: Lovenox  # Activity: IAT  # Dispo: Acute   # Code Status: Full code   45 years old Male with PMH of HTN, SHANELL on Bipap, h/o cellulitis, h/o amblyopia to Right eye comes in due to left facial swelling and pain s/p dental procedure.    # Buccal space infection / Dental abscess  - CT maxillofacial shows Significant soft tissue edema overlying the left maxilla and mandible with the nonenhancing fluid collection consistent with phlegmonous changes. Prominence of the nasopharyngeal soft tissues and almost complete opacification of the mastoid air cells, with possibility of a neoplasm arising in the nasopharynx.   - MRSA negative  - pain control with tylenol, oxycodone and morphine PRN   - BCx positive for gram positive cocci in clusters   - MRI recommended by ENT could not be done due to habitus - patient needs F/u MRI outpatient in an open MRI  - C/w Unasyn 3g IV q6  - Strict NPO, on IV fluids  - OMFS did I&D 10/12: minimal purulence attained, mainly serosanguinous  - dental consult appreciated, pt will f/u outpt   - F/u ID  - ENT will scope today. F/u ENT  - EBV positive, f/u flow cytometry for lymphoma    # Wheezing  - pt is a current smoker, 1PPD for 20 years  - c/w duonebs     # Transaminitis  - US abdomen shows hepatic steatosis and cholelithiasis  - likely fatty liver secondary to morbid obesity   - Monitor LFTs  - f/u outpt with PCP    # HTN  - c/w amlodipine 2.5mg qd and lisinopril 5mg PO qd    # SHANELL   - F/u outpatient pulmonary. Saw Dr. Parra in the past  - Uses BiPAP at home to sleep; unclear what home settings are, started on BiPAP 10-5, can be titrated. Pt states he is comfortable    # morbid obesity  - BMI 68.3  - weight loss encouraged     # Diet: NPO strict  # DVT Prophylaxis: Lovenox  # Activity: IAT  # Dispo: Acute   # Code Status: Full code   45 years old Male with PMH of HTN, SHANELL on Bipap, h/o cellulitis, h/o amblyopia to Right eye comes in due to left facial swelling and pain s/p dental procedure.    # Buccal space infection / Dental abscess  - CT maxillofacial shows Significant soft tissue edema overlying the left maxilla and mandible with the nonenhancing fluid collection consistent with phlegmonous changes. Prominence of the nasopharyngeal soft tissues and almost complete opacification of the mastoid air cells, with possibility of a neoplasm arising in the nasopharynx.   - MRSA negative  - pain control with tylenol, oxycodone and morphine PRN   - BCx positive for gram positive cocci in clusters coagulase negative ---likely contaminant    - MRI recommended by ENT could not be done due to habitus - patient needs F/u MRI outpatient in an open MRI  - C/w Unasyn 3g IV q6  - Strict NPO, on IV fluids  - OMFS did I&D 10/12: minimal purulence attained, mainly serosanguinous  - dental consult appreciated, pt will f/u outpt   - F/u ID  - ENT will scope today. F/u ENT  - EBV positive, f/u flow cytometry for lymphoma    # Wheezing  - pt is a current smoker, 1PPD for 20 years  - c/w duonebs     # Transaminitis  - US abdomen shows hepatic steatosis and cholelithiasis  - likely fatty liver secondary to morbid obesity   - Monitor LFTs, hepatitis panel negative  - f/u outpt with PCP    # HTN  - c/w amlodipine 2.5mg qd and lisinopril 5mg PO qd    # SHANELL   - F/u outpatient pulmonary. Saw Dr. Parra in the past  - Uses BiPAP at home to sleep; unclear what home settings are, started on BiPAP 10-5, can be titrated. Pt states he is comfortable    # morbid obesity  - BMI 68.3  - weight loss encouraged     # Diet: NPO strict  # DVT Prophylaxis: Lovenox  # Activity: IAT  # Dispo: Acute   # Code Status: Full code   45 years old Male with PMH of HTN, SHANELL on Bipap, h/o cellulitis, h/o amblyopia to Right eye comes in due to left facial swelling and pain s/p dental procedure.    # Buccal space infection / Dental abscess  - CT maxillofacial shows Significant soft tissue edema overlying the left maxilla and mandible with the nonenhancing fluid collection consistent with phlegmonous changes. Prominence of the nasopharyngeal soft tissues and almost complete opacification of the mastoid air cells, with possibility of a neoplasm arising in the nasopharynx.   - MRSA negative  - pain control with tylenol, oxycodone and morphine PRN   - BCx positive for gram positive cocci in clusters coagulase negative ---likely contaminant    - MRI recommended by ENT could not be done due to habitus - patient needs F/u MRI outpatient in an open MRI  - C/w Unasyn 3g IV q6  - Strict NPO, on IV fluids  - OMFS did I&D 10/12: minimal purulence attained, mainly serosanguinous  - dental consult appreciated, pt will f/u outpt   - F/u ID  - ENT will scope today. F/u ENT  - EBV positive, f/u flow cytometry for lymphoma    # Wheezing  - pt is a current smoker, 1PPD for 20 years  - c/w duonebs   - will get cxr    # Transaminitis  - US abdomen shows hepatic steatosis and cholelithiasis  - likely fatty liver secondary to morbid obesity   - Monitor LFTs, hepatitis panel negative  - f/u outpt with PCP    # HTN  - c/w amlodipine 2.5mg qd and lisinopril 5mg PO qd    # SHANELL   - F/u outpatient pulmonary. Saw Dr. Parra in the past  - Uses BiPAP at home to sleep; unclear what home settings are, started on BiPAP 10-5, can be titrated. Pt states he is comfortable    # morbid obesity  - BMI 68.3  - weight loss encouraged     # Diet: NPO strict  # DVT Prophylaxis: Lovenox  # Activity: IAT  # Dispo: Acute   # Code Status: Full code   45 years old Male with PMH of HTN, SHANELL on Bipap, h/o cellulitis, h/o amblyopia to Right eye comes in due to left facial swelling and pain s/p dental procedure.    # Buccal space infection / Dental abscess  - CT maxillofacial shows Significant soft tissue edema overlying the left maxilla and mandible with the nonenhancing fluid collection consistent with phlegmonous changes. Prominence of the nasopharyngeal soft tissues and almost complete opacification of the mastoid air cells, with possibility of a neoplasm arising in the nasopharynx.   - MRSA negative  - pain control with tylenol, oxycodone and morphine PRN   - BCx positive for gram positive cocci in clusters coagulase negative ---likely contaminant    - MRI recommended by ENT could not be done due to habitus - patient needs F/u MRI outpatient in an open MRI  - C/w Unasyn 3g IV q6  - Strict NPO, on IV fluids  - OMFS did I&D 10/12: minimal purulence attained, mainly serosanguinous  - dental consult appreciated, pt will f/u outpt   - F/u ID  - ENT will scope today. F/u ENT  - EBV positive, f/u flow cytometry for lymphoma    # Wheezing  - pt is a current smoker, 1PPD for 20 years  - c/w duonebs   - will get cxr    # Transaminitis  - US abdomen shows hepatic steatosis and cholelithiasis  - likely fatty liver secondary to morbid obesity   - Monitor LFTs, hepatitis panel negative  - f/u outpt with PCP    # HTN  - c/w amlodipine 2.5mg qd and lisinopril 5mg PO qd    # SHANELL   - F/u outpatient pulmonary. Saw Dr. Parra in the past  - Uses BiPAP at home to sleep; unclear what home settings are, started on BiPAP 10-5, can be titrated. Pt states he is comfortable    # morbid obesity  - BMI 68.3  - weight loss encouraged     # Diet: will start with full liquid and advance as tolerated   # DVT Prophylaxis: Lovenox  # Activity: IAT  # Dispo: Acute   # Code Status: Full code   45 years old Male with PMH of HTN, SHANELL on Bipap, h/o cellulitis, h/o amblyopia to Right eye comes in due to left facial swelling and pain s/p dental procedure.    # Buccal space infection / Dental abscess  - CT maxillofacial shows Significant soft tissue edema overlying the left maxilla and mandible with the nonenhancing fluid collection consistent with phlegmonous changes. Prominence of the nasopharyngeal soft tissues and almost complete opacification of the mastoid air cells, with possibility of a neoplasm arising in the nasopharynx.   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  Patient to follow up as outpatient within 10 days to schedule a biopsy.   - EBV positive, f/u flow cytometry for lymphoma    # Wheezing  - pt is a current smoker, 1PPD for 20 years  - c/w duonebs   - will get cxr    # Transaminitis  - US abdomen shows hepatic steatosis and cholelithiasis  - likely fatty liver secondary to morbid obesity   - Monitor LFTs, hepatitis panel negative  - f/u outpt with PCP    # HTN  - c/w amlodipine 2.5mg qd and lisinopril 5mg PO qd    # SHANELL   - F/u outpatient pulmonary. Saw Dr. Parra in the past  - Uses BiPAP at home to sleep; unclear what home settings are, started on BiPAP 10-5, can be titrated. Pt states he is comfortable    # morbid obesity  - BMI 68.3  - weight loss encouraged     # Diet: will start with full liquid and advance as tolerated   # DVT Prophylaxis: Lovenox  # Activity: IAT  # Dispo: Acute   # Code Status: Full code   45 years old Male with PMH of HTN, SHANELL on Bipap, h/o cellulitis, h/o amblyopia to Right eye comes in due to left facial swelling and pain s/p dental procedure.    # Buccal space infection / Dental abscess  - CT maxillofacial shows Significant soft tissue edema overlying the left maxilla and mandible with the nonenhancing fluid collection consistent with phlegmonous changes. Prominence of the nasopharyngeal soft tissues and almost complete opacification of the mastoid air cells, with possibility of a neoplasm arising in the nasopharynx.   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  Patient to follow up as outpatient within 10 days to schedule a biopsy.   - EBV positive, f/u flow cytometry for lymphoma    # Wheezing  - pt is a current smoker, 1PPD for 20 years  - c/w duonebs   - will get cxr    # Transaminitis  - US abdomen shows hepatic steatosis and cholelithiasis  - likely fatty liver secondary to morbid obesity   - Monitor LFTs, hepatitis panel negative  - f/u outpt with PCP    # HTN  - c/w amlodipine 2.5mg qd and lisinopril 5mg PO qd    # SHANELL   - F/u outpatient pulmonary. Saw Dr. Parra in the past  - Uses BiPAP at home to sleep; unclear what home settings are, started on BiPAP 10-5, can be titrated. Pt states he is comfortable    # morbid obesity  - BMI 68.3  - weight loss encouraged     # Diet: will start with full liquid and advance as tolerated   # DVT Prophylaxis: Lovenox  # Activity: IAT  # Dispo: Acute   # Code Status: Full code

## 2020-10-13 NOTE — PROGRESS NOTE ADULT - SUBJECTIVE AND OBJECTIVE BOX
45M w L buccal space abscess, possibly of odontogenic origin    24 hour events: afebrile. wbc downtrending to 10.72. i/d and exo #12/14 performed; see below for details    O:  vitals as per chart, reviewed, afebrile  labs as per chart, reviewed, WBC as above    Relevant PE:  Gen: AAOx3, NAD  HEENT:   L submandibular edema w overlying erythema; mild L maxillary cutaneous edema  EOMI, PERRLA  No rhinorrhea  No otorrhea  T: unable to visualize uvula  Oral: trismus to 30 mm; no spontaneous drainage    Pt was transported to dental clinic this AM for pan and exam  Pan/PAs show #12/14 carious and likely source of infection    Procedure:  consent and time out signed and performed- see clinic documents for full details  Anesthetized w 10 cc 2% lidocaine w 1:100,000 epinephrine  extraction #12, 14 performed with no complications  Left buccal space i/d performed by incision into L buccinator. 15 cc purulence attained  Explored and dissected buccinator incision with curved charlene. Thorough irrigation w sterile saline  Packed buccal incision w iodoform packing    AP: 45M w L buccal space infection, likely odontogenic origin; s/p i/d L buccinator    -no contraindications to oral diet from OMFS standpoint  -c/w unasyn  -trend labs  -OMFS will continue to follow  -appreciate ENT recs  -rest of care as per primary team    Froylan Ellsworth DMD, MD, PGY3  d/w Dr. Gusman

## 2020-10-13 NOTE — PROGRESS NOTE ADULT - SUBJECTIVE AND OBJECTIVE BOX
Patient is a 45y old Male who presents with a chief complaint of Facial swelling (12 Oct 2020 16:22)    HPI:  45 years old Male with PMH of HTN, SHANELL on Bipap, h/o cellulitis, h/o amblyopia to Right eye comes in due to left facial swelling and pain s/p dental procedure.  History of presenting illness goes back to 2 weeks ago when patient went to see his dentist for tooth pain. Pt got a dental filling and was scheduled for root canal on November 1. The pain worsened over the course of time and patient had to visit back dentist of a refilling on Friday. Pt was put on PO amoxicillin but pain worsened and patient started experiencing left sided facial swelling on Friday night accompanied with severe pain. Last night patient experienced fever of 102F and was brought into [facility] ED south. Pt was diagnosed with dental abscess and was referred to [place] for dental evaluation.    Pt. denies active purulent drainage from mouth, otalgia, loss of hearing, vision loss or difficulty breathing.     In the ED, BP was 168/105, , Temp 101.8, RR 20, saturating well on RA. Labs are significant for leukocytosis at 11.70, elevated LFTs bilirubin 1.6, AST 88, , . CT maxillofacial shows Significant soft tissue edema overlying the left maxilla and mandible with the nonenhancing fluid collection consistent with phlegmonous changes. Prominence of the nasopharyngeal soft tissues and almost complete opacification of the mastoid air cells, with possibility of a neoplasm arising in the nasopharynx. Pt received 1L LR bolus in ED and Unasyn started. ENT was consulted MRI recommended. Pt to be admitted under medicine for further workup.    (11 Oct 2020 18:29)      PAST MEDICAL & SURGICAL HISTORY:  Sleep apnea on BiPAP    HTN (hypertension)    No significant past surgical history        Tobacco use:   1PPD for 20 years    Allergies:   No Known Allergies      FAMILY HISTORY:  No pertinent family history in first degree relatives      HOSPITAL MEDICATIONS:   acetaminophen   Tablet .. 650 milliGRAM(s) Oral every 6 hours PRN  albuterol/ipratropium for Nebulization 3 milliLiter(s) Nebulizer every 6 hours  amLODIPine   Tablet 2.5 milliGRAM(s) Oral daily  ampicillin/sulbactam  IVPB      ampicillin/sulbactam  IVPB 3 Gram(s) IV Intermittent every 6 hours  chlorhexidine 4% Liquid 1 Application(s) Topical <User Schedule>  enoxaparin Injectable 40 milliGRAM(s) SubCutaneous daily  influenza   Vaccine 0.5 milliLiter(s) IntraMuscular once  lactated ringers. 1000 milliLiter(s) IV Continuous <Continuous>  lisinopril 5 milliGRAM(s) Oral daily  morphine  - Injectable 6 milliGRAM(s) IV Push every 6 hours PRN  oxycodone    5 mG/acetaminophen 325 mG 2 Tablet(s) Oral every 6 hours PRN  polyethylene glycol 3350 17 Gram(s) Oral daily  senna 2 Tablet(s) Oral at bedtime      Vital Signs Last 24 Hrs  T(C): 36 (13 Oct 2020 08:13), Max: 37.5 (12 Oct 2020 15:15)  T(F): 96.8 (13 Oct 2020 08:13), Max: 99.5 (12 Oct 2020 15:15)  HR: 82 (13 Oct 2020 08:13) (78 - 95)  BP: 127/88 (13 Oct 2020 08:13) (122/70 - 162/81)  BP(mean): --  RR: 20 (13 Oct 2020 08:13) (19 - 20)  SpO2: 95% (12 Oct 2020 20:21) (95% - 95%)    I&O's Summary    12 Oct 2020 07:01  -  13 Oct 2020 07:00  --------------------------------------------------------  IN: 240 mL / OUT: 0 mL / NET: 240 mL      LABS                        12.7   10.71 )-----------( 205      ( 13 Oct 2020 06:24 )             38.2       10-13    136  |  100  |  9<L>  ----------------------------<  108<H>  3.7   |  23  |  0.8    Ca    8.7      13 Oct 2020 06:24  Mg     1.8     10-12    TPro  6.6  /  Alb  3.5  /  TBili  1.0  /  DBili  x   /  AST  42<H>  /  ALT  112<H>  /  AlkPhos  127<H>  10-13        Culture - Blood (collected 11 Oct 2020 13:02)  Source: .Blood Blood  Preliminary Report (12 Oct 2020 21:01):    No growth to date.    Culture - Blood (collected 11 Oct 2020 13:02)  Source: .Blood Blood  Gram Stain (12 Oct 2020 16:53):    Growth in aerobic bottle: Gram Positive Cocci in Clusters  Preliminary Report (12 Oct 2020 16:53):    Growth in aerobic bottle: Gram Positive Cocci in Clusters    ***Blood Panel PCR results on this specimen are available    approximately 3 hours after the Gram stain result.***    Gram stain, PCR, and/or culture results may not always    correspond due to difference in methodologies.    ************************************************************    This PCR assay was performed using [employer].    The following targets are tested for: Enterococcus,    vancomycin resistant enterococci, Listeria monocytogenes,    coagulase negative staphylococci, S. aureus,    methicillin resistant S. aureus, Streptococcus agalactiae    (Group B), S. pneumoniae, S. pyogenes (Group A),    Acinetobacter baumannii, Enterobacter cloacae, E. coli,    Klebsiella oxytoca, K. pneumoniae, Proteus sp.,    Serratia marcescens, Haemophilus influenzae,    Neisseria meningitidis, Pseudomonas aeruginosa, Candida    albicans, C. glabrata, C krusei, C parapsilosis,    C. tropicalis and the KPC resistance gene.    "Due to technical problems, Proteus sp. will Not be reported as part of    the BCID panel until further notice"  Organism: Blood Culture PCR (12 Oct 2020 18:55)  Organism: Blood Culture PCR (12 Oct 2020 18:55)        PHYSICAL EXAM:  GENERAL: NAD, morbidly obese, lying in bed comfortably  HEAD:  Atraumatic, Normocephalic  EYES: EOMI, conjunctiva and sclera clear  ENT: Left facial swelling over the mandible and maxillary region, erythema of overlying skin. Tender and firm to touch   NECK: Supple, No JVD  CHEST/LUNG: Unlabored respirations, expiratory wheezing in R posterior lung fields   HEART: Regular rate and rhythm; No murmurs, rubs, or gallops  ABDOMEN: Protruding abdomen. Bowel sounds present; Soft, Nontender, Nondistended. No hepatomegaly  EXTREMITIES: No clubbing, cyanosis, or edema  NERVOUS SYSTEM:  Alert & Oriented X3, speech clear. No deficits     IMAGES:  < from: CT Maxillofacial w/ IV Cont (10.11.20 @ 14:26) >  IMPRESSION:  1.  Significant soft tissue edema overlying the left maxilla and mandible with the nonenhancing fluid collection consistent with phlegmonous changes.  2.  Prominence of the nasopharyngeal soft tissues and almost complete opacification of the mastoid air cells, the possibility of a neoplasm arising in the nasopharynx should be considered.

## 2020-10-13 NOTE — CONSULT NOTE ADULT - SUBJECTIVE AND OBJECTIVE BOX
BELEN CASTILLO  45y, Male  Allergy: No Known Allergies      All historical available data reviewed.    HPI:  45 years old Male with PMH of HTN, SHANELL on Bipap, h/o cellulitis, h/o amblyopia to Right eye comes in due to left facial swelling and pain s/p dental procedure.  History of presenting illness goes back to 2 weeks ago when patient went to see his dentist for tooth pain. Pt got a dental filling and was scheduled for root canal on November 1. The pain worsened over the course of time and patient had to visit back dentist of a refilling on Friday. Pt was put on PO amoxicillin but pain worsened and patient started experiencing left sided facial swelling on Friday night accompanied with severe pain. Last night patient experienced fever of 102F and was brought into [facility] ED south. Pt was diagnosed with dental abscess and was referred to [facility] for dental evaluation.    Pt. denies active purulent drainage from mouth, otalgia, loss of hearing, vision loss or difficulty breathing.     In the ED, BP was 168/105, , Temp 101.8, RR 20, saturating well on RA. Labs are significant for leukocytosis at 11.70, elevated LFTs bilirubin 1.6, AST 88, , . CT maxillofacial shows Significant soft tissue edema overlying the left maxilla and mandible with the nonenhancing fluid collection consistent with phlegmonous changes. Prominence of the nasopharyngeal soft tissues and almost complete opacification of the mastoid air cells, with possibility of a neoplasm arising in the nasopharynx. Pt received 1L LR bolus in ED and Unasyn started. ENT was consulted MRI recommended. Pt to be admitted under medicine for further workup.    (11 Oct 2020 18:29)    ID called for ABx    FAMILY HISTORY:  No pertinent family history in first degree relatives      PAST MEDICAL & SURGICAL HISTORY:  Sleep apnea    HTN (hypertension)    No significant past surgical history          VITALS:  T(F): 96.3, Max: 99.5 (10-12-20 @ 15:15)  HR: 80  BP: 128/79  RR: 18Vital Signs Last 24 Hrs  T(C): 35.7 (13 Oct 2020 11:31), Max: 37.5 (12 Oct 2020 15:15)  T(F): 96.3 (13 Oct 2020 11:31), Max: 99.5 (12 Oct 2020 15:15)  HR: 80 (13 Oct 2020 11:31) (78 - 95)  BP: 128/79 (13 Oct 2020 11:31) (122/70 - 162/81)  BP(mean): --  RR: 18 (13 Oct 2020 11:31) (18 - 20)  SpO2: 95% (12 Oct 2020 20:21) (95% - 95%)    TESTS & MEASUREMENTS:                        12.7   10.71 )-----------( 205      ( 13 Oct 2020 06:24 )             38.2     10-13    136  |  100  |  9<L>  ----------------------------<  108<H>  3.7   |  23  |  0.8    Ca    8.7      13 Oct 2020 06:24  Mg     1.8     10-12    TPro  6.6  /  Alb  3.5  /  TBili  1.0  /  DBili  x   /  AST  42<H>  /  ALT  112<H>  /  AlkPhos  127<H>  10-13    LIVER FUNCTIONS - ( 13 Oct 2020 06:24 )  Alb: 3.5 g/dL / Pro: 6.6 g/dL / ALK PHOS: 127 U/L / ALT: 112 U/L / AST: 42 U/L / GGT: x             Culture - Blood (collected 10-11-20 @ 13:02)  Source: .Blood Blood  Preliminary Report (10-12-20 @ 21:01):    No growth to date.    Culture - Blood (collected 10-11-20 @ 13:02)  Source: .Blood Blood  Gram Stain (10-12-20 @ 16:53):    Growth in aerobic bottle: Gram Positive Cocci in Clusters  Preliminary Report (10-12-20 @ 16:53):    Growth in aerobic bottle: Gram Positive Cocci in Clusters    ***Blood Panel PCR results on this specimen are available    approximately 3 hours after the Gram stain result.***    Gram stain, PCR, and/or culture results may not always    correspond due to difference in methodologies.    ************************************************************    This PCR assay was performed using [employer].    The following targets are tested for: Enterococcus,    vancomycin resistant enterococci, Listeria monocytogenes,    coagulase negative staphylococci, S. aureus,    methicillin resistant S. aureus, Streptococcus agalactiae    (Group B), S. pneumoniae, S. pyogenes (Group A),    Acinetobacter baumannii, Enterobacter cloacae, E. coli,    Klebsiella oxytoca, K. pneumoniae, Proteus sp.,    Serratia marcescens, Haemophilus influenzae,    Neisseria meningitidis, Pseudomonas aeruginosa, Candida    albicans, C. glabrata, C krusei, C parapsilosis,    C. tropicalis and the KPC resistance gene.    "Due to technical problems, Proteus sp. will Not be reported as part of    the BCID panel until further notice"  Organism: Blood Culture PCR (10-12-20 @ 18:55)  Organism: Blood Culture PCR (10-12-20 @ 18:55)      -  Coagulase negative Staphylococcus: Detec      Method Type: PCR            RADIOLOGY & ADDITIONAL TESTS:  Personal review of radiological diagnostics performed  Echo and EKG results noted when applicable.     MEDICATIONS:  acetaminophen   Tablet .. 650 milliGRAM(s) Oral every 6 hours PRN  albuterol/ipratropium for Nebulization 3 milliLiter(s) Nebulizer every 6 hours  amLODIPine   Tablet 2.5 milliGRAM(s) Oral daily  ampicillin/sulbactam  IVPB      ampicillin/sulbactam  IVPB 3 Gram(s) IV Intermittent every 6 hours  chlorhexidine 4% Liquid 1 Application(s) Topical <User Schedule>  enoxaparin Injectable 40 milliGRAM(s) SubCutaneous daily  influenza   Vaccine 0.5 milliLiter(s) IntraMuscular once  lactated ringers. 1000 milliLiter(s) IV Continuous <Continuous>  lisinopril 5 milliGRAM(s) Oral daily  morphine  - Injectable 6 milliGRAM(s) IV Push every 6 hours PRN  oxycodone    5 mG/acetaminophen 325 mG 2 Tablet(s) Oral every 6 hours PRN  polyethylene glycol 3350 17 Gram(s) Oral daily  senna 2 Tablet(s) Oral at bedtime      ANTIBIOTICS:  ampicillin/sulbactam  IVPB      ampicillin/sulbactam  IVPB 3 Gram(s) IV Intermittent every 6 hours

## 2020-10-13 NOTE — CONSULT NOTE ADULT - ASSESSMENT
45M w L buccal space infection, likely odontogenic origin; s/p i/d L [id number] w L buccal space infection, likely odontogenic origin; s/p i/d L buccinator    IMPRESSION;  Left Dental abscess with left buccinator abscess.  S/p drainage of abscess  BCx 1/2 on 12/11 CoNS ( not a true pathogen    RECOMMENDATIONS;  Clindamycin 900 mg iv q8h  Unasyn 3 gm iv q6h  F/u with OMFS

## 2020-10-14 LAB
ALBUMIN SERPL ELPH-MCNC: 3.3 G/DL — LOW (ref 3.5–5.2)
ALP SERPL-CCNC: 132 U/L — HIGH (ref 30–115)
ALT FLD-CCNC: 87 U/L — HIGH (ref 0–41)
ANION GAP SERPL CALC-SCNC: 11 MMOL/L — SIGNIFICANT CHANGE UP (ref 7–14)
AST SERPL-CCNC: 30 U/L — SIGNIFICANT CHANGE UP (ref 0–41)
BILIRUB SERPL-MCNC: 0.9 MG/DL — SIGNIFICANT CHANGE UP (ref 0.2–1.2)
BUN SERPL-MCNC: 10 MG/DL — SIGNIFICANT CHANGE UP (ref 10–20)
CALCIUM SERPL-MCNC: 8.7 MG/DL — SIGNIFICANT CHANGE UP (ref 8.5–10.1)
CHLORIDE SERPL-SCNC: 101 MMOL/L — SIGNIFICANT CHANGE UP (ref 98–110)
CO2 SERPL-SCNC: 24 MMOL/L — SIGNIFICANT CHANGE UP (ref 17–32)
CREAT SERPL-MCNC: 0.8 MG/DL — SIGNIFICANT CHANGE UP (ref 0.7–1.5)
GLUCOSE SERPL-MCNC: 111 MG/DL — HIGH (ref 70–99)
HCT VFR BLD CALC: 39.3 % — LOW (ref 42–52)
HGB BLD-MCNC: 12.9 G/DL — LOW (ref 14–18)
MCHC RBC-ENTMCNC: 27.6 PG — SIGNIFICANT CHANGE UP (ref 27–31)
MCHC RBC-ENTMCNC: 32.8 G/DL — SIGNIFICANT CHANGE UP (ref 32–37)
MCV RBC AUTO: 84 FL — SIGNIFICANT CHANGE UP (ref 80–94)
NRBC # BLD: 0 /100 WBCS — SIGNIFICANT CHANGE UP (ref 0–0)
PLATELET # BLD AUTO: 229 K/UL — SIGNIFICANT CHANGE UP (ref 130–400)
POTASSIUM SERPL-MCNC: 3.7 MMOL/L — SIGNIFICANT CHANGE UP (ref 3.5–5)
POTASSIUM SERPL-SCNC: 3.7 MMOL/L — SIGNIFICANT CHANGE UP (ref 3.5–5)
PROT SERPL-MCNC: 6.5 G/DL — SIGNIFICANT CHANGE UP (ref 6–8)
RBC # BLD: 4.68 M/UL — LOW (ref 4.7–6.1)
RBC # FLD: 13.8 % — SIGNIFICANT CHANGE UP (ref 11.5–14.5)
SODIUM SERPL-SCNC: 136 MMOL/L — SIGNIFICANT CHANGE UP (ref 135–146)
WBC # BLD: 9.46 K/UL — SIGNIFICANT CHANGE UP (ref 4.8–10.8)
WBC # FLD AUTO: 9.46 K/UL — SIGNIFICANT CHANGE UP (ref 4.8–10.8)

## 2020-10-14 PROCEDURE — 99233 SBSQ HOSP IP/OBS HIGH 50: CPT

## 2020-10-14 RX ADMIN — Medication 3 MILLILITER(S): at 19:14

## 2020-10-14 RX ADMIN — AMPICILLIN SODIUM AND SULBACTAM SODIUM 200 GRAM(S): 250; 125 INJECTION, POWDER, FOR SUSPENSION INTRAMUSCULAR; INTRAVENOUS at 17:04

## 2020-10-14 RX ADMIN — OXYCODONE AND ACETAMINOPHEN 2 TABLET(S): 5; 325 TABLET ORAL at 08:10

## 2020-10-14 RX ADMIN — OXYCODONE AND ACETAMINOPHEN 2 TABLET(S): 5; 325 TABLET ORAL at 23:00

## 2020-10-14 RX ADMIN — SODIUM CHLORIDE 100 MILLILITER(S): 9 INJECTION, SOLUTION INTRAVENOUS at 23:44

## 2020-10-14 RX ADMIN — AMPICILLIN SODIUM AND SULBACTAM SODIUM 200 GRAM(S): 250; 125 INJECTION, POWDER, FOR SUSPENSION INTRAMUSCULAR; INTRAVENOUS at 05:32

## 2020-10-14 RX ADMIN — OXYCODONE AND ACETAMINOPHEN 2 TABLET(S): 5; 325 TABLET ORAL at 08:09

## 2020-10-14 RX ADMIN — OXYCODONE AND ACETAMINOPHEN 2 TABLET(S): 5; 325 TABLET ORAL at 16:04

## 2020-10-14 RX ADMIN — CHLORHEXIDINE GLUCONATE 1 APPLICATION(S): 213 SOLUTION TOPICAL at 05:32

## 2020-10-14 RX ADMIN — Medication 100 MILLIGRAM(S): at 21:29

## 2020-10-14 RX ADMIN — Medication 100 MILLIGRAM(S): at 13:22

## 2020-10-14 RX ADMIN — AMPICILLIN SODIUM AND SULBACTAM SODIUM 200 GRAM(S): 250; 125 INJECTION, POWDER, FOR SUSPENSION INTRAMUSCULAR; INTRAVENOUS at 23:43

## 2020-10-14 RX ADMIN — AMPICILLIN SODIUM AND SULBACTAM SODIUM 200 GRAM(S): 250; 125 INJECTION, POWDER, FOR SUSPENSION INTRAMUSCULAR; INTRAVENOUS at 11:35

## 2020-10-14 RX ADMIN — OXYCODONE AND ACETAMINOPHEN 2 TABLET(S): 5; 325 TABLET ORAL at 16:34

## 2020-10-14 RX ADMIN — AMLODIPINE BESYLATE 2.5 MILLIGRAM(S): 2.5 TABLET ORAL at 05:32

## 2020-10-14 RX ADMIN — ENOXAPARIN SODIUM 40 MILLIGRAM(S): 100 INJECTION SUBCUTANEOUS at 11:35

## 2020-10-14 RX ADMIN — LISINOPRIL 5 MILLIGRAM(S): 2.5 TABLET ORAL at 05:32

## 2020-10-14 RX ADMIN — SODIUM CHLORIDE 100 MILLILITER(S): 9 INJECTION, SOLUTION INTRAVENOUS at 21:30

## 2020-10-14 RX ADMIN — Medication 100 MILLIGRAM(S): at 05:32

## 2020-10-14 RX ADMIN — OXYCODONE AND ACETAMINOPHEN 2 TABLET(S): 5; 325 TABLET ORAL at 22:30

## 2020-10-14 NOTE — PROGRESS NOTE ADULT - SUBJECTIVE AND OBJECTIVE BOX
45M w L buccal space abscess, possibly of odontogenic origin. POD 1 s/p i/d buccal space abscess w exo #12,14 in clinic. Packing changed at bedside this AM and incision site drained thoroughly w saline    24 hour events: afebrile. wbc downtrending to 9.     Relevant ROS: complains of L buccal vestibular pain, otherwise denies dysphagia, odynophagia, dyspnea    O:  vitals as per chart, reviewed, afebrile  labs as per chart, reviewed, WBC as above    Relevant PE:  Gen: AAOx3, NAD  HEENT:    L submandibular edema w overlying erythema; mild L maxillary cutaneous edema; edema and erythema mildly improved since yesterday  EOMI, PERRLA  No rhinorrhea  No otorrhea  T: unable to visualize uvula  Oral: trismus to 30 mm; no spontaneous drainage; #12,14 exo sites healing well    AP: 45M w L buccal space infection, likely odontogenic origin; POD1 s/p i/d L buccinator. improving clinically    -ADAT  -appreciate ID recs for IV antibiotics  -trend labs  -OMFS will continue to follow  -appreciate ENT recs  -rest of care as per primary team    Froylan Ellsworth DMD, MD, PGY3  d/w Dr. Gusman     45M w L buccal space abscess, possibly of odontogenic origin. POD 1 s/p i/d buccal space abscess w exo #12,14 in clinic. Packing changed at bedside this AM and incision site drained thoroughly w saline    24 hour events: afebrile. wbc downtrending to 9.     Relevant ROS: complains of L buccal vestibular pain, otherwise denies dysphagia, odynophagia, dyspnea    O:  vitals as per chart, reviewed, afebrile  labs as per chart, reviewed, WBC as above    Relevant PE:  Gen: AAOx3, NAD  HEENT:    L submandibular edema w overlying erythema; mild L maxillary cutaneous edema; edema and erythema mildly improved since yesterday  EOMI, PERRLA  No rhinorrhea  No otorrhea  T: unable to visualize uvula  Oral: trismus to 30 mm; no spontaneous drainage; #12,14 exo sites healing well    AP: 45M w L buccal space infection, likely odontogenic origin; POD1 s/p i/d L buccinator. improving clinically    -ADAT  -appreciate ID recs for IV antibiotics  -trend labs  -OMFS will continue to follow  -appreciate ENT recs  -rest of care as per primary team  -if patient is to be discharged, please have him call Dr. Grace office at [phone] for follow up appointment    Froylan Ellsworth DMD, MD, PGY3  d/w Dr. Gusman

## 2020-10-14 NOTE — PROGRESS NOTE ADULT - SUBJECTIVE AND OBJECTIVE BOX
45 years old Male with PMH of HTN, SHANELL on Bipap, h/o cellulitis, h/o amblyopia to Right eye comes in due to left facial swelling and pain s/p dental procedure.    10/14:  Pt seen at bedside, states he thinks facial swelling and pain have improved.        REVIEW OF SYSTEMS:    CONSTITUTIONAL: No fever, weight loss, or fatigue  EYES: No eye pain, visual disturbances, or discharge  ENMT:  No difficulty hearing, tinnitus, vertigo; No sinus or throat pain.  + jaw pain.  NECK: No pain or stiffness    MEDICATIONS  (STANDING):  albuterol/ipratropium for Nebulization 3 milliLiter(s) Nebulizer every 6 hours  amLODIPine   Tablet 2.5 milliGRAM(s) Oral daily  ampicillin/sulbactam  IVPB      ampicillin/sulbactam  IVPB 3 Gram(s) IV Intermittent every 6 hours  chlorhexidine 4% Liquid 1 Application(s) Topical <User Schedule>  clindamycin IVPB 900 milliGRAM(s) IV Intermittent every 8 hours  enoxaparin Injectable 40 milliGRAM(s) SubCutaneous daily  influenza   Vaccine 0.5 milliLiter(s) IntraMuscular once  lactated ringers. 1000 milliLiter(s) (100 mL/Hr) IV Continuous <Continuous>  lisinopril 5 milliGRAM(s) Oral daily  polyethylene glycol 3350 17 Gram(s) Oral daily  senna 2 Tablet(s) Oral at bedtime    MEDICATIONS  (PRN):  acetaminophen   Tablet .. 650 milliGRAM(s) Oral every 6 hours PRN Temp greater or equal to 38C (100.4F), Mild Pain (1 - 3)  morphine  - Injectable 6 milliGRAM(s) IV Push every 6 hours PRN Severe Pain (7 - 10)  oxycodone    5 mG/acetaminophen 325 mG 2 Tablet(s) Oral every 6 hours PRN Moderate Pain (4 - 6)      Allergies  No Known Allergies      FAMILY HISTORY:  No pertinent family history in first degree relatives        Vital Signs Last 24 Hrs  T(C): 36.4 (14 Oct 2020 07:34), Max: 37.1 (13 Oct 2020 15:54)  T(F): 97.5 (14 Oct 2020 07:34), Max: 98.7 (13 Oct 2020 15:54)  HR: 85 (14 Oct 2020 07:34) (78 - 97)  BP: 118/57 (14 Oct 2020 07:34) (116/67 - 151/67)  RR: 18 (14 Oct 2020 07:34) (17 - 18)  SpO2: 95% (13 Oct 2020 21:41) (95% - 95%)    PHYSICAL EXAM:    GENERAL: NAD, morbidly obese, lying in bed comfortably  HEAD:  Atraumatic, Normocephalic  EYES: EOMI, conjunctiva and sclera clear  ENT: Left facial swelling over the mandible and maxillary region, erythema of overlying skin. Tender and firm to touch   NECK: Supple, No JVD  CHEST/LUNG: Unlabored respirations, expiratory wheezing in R posterior lung fields   HEART: Regular rate and rhythm; No murmurs, rubs, or gallops  ABDOMEN: Protruding abdomen. Bowel sounds present; Soft, Nontender, Nondistended. No hepatomegaly  EXTREMITIES: No clubbing, cyanosis, or edema  NERVOUS SYSTEM:  Alert & Oriented X3, speech clear. No deficits       LABS:                        12.9   9.46  )-----------( 229      ( 14 Oct 2020 06:14 )             39.3     10-14    136  |  101  |  10  ----------------------------<  111<H>  3.7   |  24  |  0.8    Ca    8.7      14 Oct 2020 06:14    TPro  6.5  /  Alb  3.3<L>  /  TBili  0.9  /  DBili  x   /  AST  30  /  ALT  87<H>  /  AlkPhos  132<H>  10-14

## 2020-10-14 NOTE — PROGRESS NOTE ADULT - ASSESSMENT
45 years old Male with PMH of HTN, SHANELL on Bipap, h/o cellulitis, h/o amblyopia to Right eye comes in due to left facial swelling and pain s/p dental procedure.    # Buccal space infection / Dental abscess  - CT maxillofacial shows Significant soft tissue edema overlying the left maxilla and mandible with the nonenhancing fluid collection consistent with phlegmonous changes. Prominence of the nasopharyngeal soft tissues and almost complete opacification of the mastoid air cells, with possibility of a neoplasm arising in the nasopharynx.   - MRSA negative  - pain control with tylenol, oxycodone and morphine PRN   - BCx positive for gram positive cocci in clusters coagulase negative ---likely contaminant    - MRI recommended by ENT could not be done due to habitus - patient needs F/u MRI outpatient in an open MRI  - c/w IV fluids  - OMFS did I&D 10/12: minimal purulence attained, mainly serosanguinous  - dental consult appreciated, pt will f/u outpt   - ID consult appreciated  -Continue Clindamycin and Unasyn, possible switch to PO tomorrow.    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  Patient to follow up as outpatient within 10 days to schedule a biopsy.   - EBV positive, f/u flow cytometry for lymphoma      # Wheezing  - pt is a current smoker, 1PPD for 20 years  - c/w aaron     # Transaminitis  - US abdomen shows hepatic steatosis and cholelithiasis  - likely fatty liver secondary to morbid obesity   - Monitor LFTs, hepatitis panel negative  - f/u outpt with PCP    # HTN  - c/w amlodipine 2.5mg qd and lisinopril 5mg PO qd    # SHANELL   - F/u outpatient pulmonary. Saw Dr. Parra in the past  - Uses BiPAP at home to sleep; unclear what home settings are, started on BiPAP 10-5, can be titrated. Pt states he is comfortable    # morbid obesity  - BMI 68.3  - weight loss encouraged     # Diet: will start with full liquid and advance as tolerated as per OMFS  # DVT Prophylaxis: Lovenox  # Activity: IAT  # Dispo: Acute   # Code Status: Full code

## 2020-10-14 NOTE — PROGRESS NOTE ADULT - ASSESSMENT
45 years old Male with PMH of HTN, SHANELL on Bipap, h/o cellulitis, h/o amblyopia to Right eye comes in due to left facial swelling and pain s/p dental procedure.    # Buccal space infection / Dental abscess  - CT maxillofacial shows Significant soft tissue edema overlying the left maxilla and mandible with the nonenhancing fluid collection consistent with phlegmonous changes. Prominence of the nasopharyngeal soft tissues and almost complete opacification of the mastoid air cells, with possibility of a neoplasm arising in the nasopharynx.   - MRSA negative  - pain control with tylenol, oxycodone and morphine PRN   - BCx positive for gram positive cocci in clusters coagulase negative ---likely contaminant    - MRI recommended by ENT could not be done due to habitus - patient needs F/u MRI outpatient in an open MRI  - C/w Unasyn 3g IV q6  - c/w IV fluids  - OMFS did I&D 10/12: minimal purulence attained, mainly serosanguinous. 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  Patient to follow up as outpatient within 10 days to schedule a biopsy.   - EBV positive, flow cytometry for lymphoma results pending   - ID consult appreciated, pt started on clinda 900 IV q8    # Wheezing  - pt is a current smoker, 1PPD for 20 years  - c/w aaron     # Transaminitis  - US abdomen shows hepatic steatosis and cholelithiasis  - likely fatty liver secondary to morbid obesity   - Monitor LFTs, hepatitis panel negative  - f/u outpt with PCP    # HTN  - c/w amlodipine 2.5mg qd and lisinopril 5mg PO qd    # SHANELL   - F/u outpatient pulmonary. Saw Dr. Parra in the past  - Uses BiPAP at home to sleep; unclear what home settings are, started on BiPAP 10-5, can be titrated. Pt states he is comfortable  - Xray shows low lung volumes--> restrictive pulmonary disease, likely obesity hypoventilation syndrome     # morbid obesity  - BMI 68.3  - weight loss encouraged     # Diet: will start with full liquid and advance as tolerated   # DVT Prophylaxis: Lovenox  # Activity: IAT  # Dispo: Acute   # Code Status: Full code  Pending: OMFS f/u, dc planning, will switch to PO ABx in AM, clinical improvement

## 2020-10-14 NOTE — PROGRESS NOTE ADULT - ASSESSMENT
· Assessment	  45M w L buccal space infection, likely odontogenic origin; s/p i/d L [id number] w L buccal space infection, likely odontogenic origin; s/p i/d L buccinator    IMPRESSION;  Left Dental abscess with left buccinator abscess.  S/p drainage of abscess  BCx 1/2 on 12/11 CoNS ( not a true pathogen )    RECOMMENDATIONS;  Clindamycin 900 mg iv q8h  Unasyn 3 gm iv q6h  F/u with OMFS  Will change to po ABx in am

## 2020-10-14 NOTE — PROGRESS NOTE ADULT - SUBJECTIVE AND OBJECTIVE BOX
Patient is a 45y old Male who presents with a chief complaint of Facial swelling (12 Oct 2020 16:22)    HPI:  45 years old Male with PMH of HTN, SHANELL on Bipap, h/o cellulitis, h/o amblyopia to Right eye comes in due to left facial swelling and pain s/p dental procedure.  History of presenting illness goes back to 2 weeks ago when patient went to see his dentist for tooth pain. Pt got a dental filling and was scheduled for root canal on November 1. The pain worsened over the course of time and patient had to visit back dentist of a refilling on Friday. Pt was put on PO amoxicillin but pain worsened and patient started experiencing left sided facial swelling on Friday night accompanied with severe pain. Last night patient experienced fever of 102F and was brought into [facility] ED south. Pt was diagnosed with dental abscess and was referred to [facility] for dental evaluation.    Pt. denies active purulent drainage from mouth, otalgia, loss of hearing, vision loss or difficulty breathing.     In the ED, BP was 168/105, , Temp 101.8, RR 20, saturating well on RA. Labs are significant for leukocytosis at 11.70, elevated LFTs bilirubin 1.6, AST 88, , . CT maxillofacial shows Significant soft tissue edema overlying the left maxilla and mandible with the nonenhancing fluid collection consistent with phlegmonous changes. Prominence of the nasopharyngeal soft tissues and almost complete opacification of the mastoid air cells, with possibility of a neoplasm arising in the nasopharynx. Pt received 1L LR bolus in ED and Unasyn started. ENT was consulted MRI recommended. Pt to be admitted under medicine for further workup.    (11 Oct 2020 18:29)      PAST MEDICAL & SURGICAL HISTORY:  Sleep apnea on BiPAP    HTN (hypertension)    No significant past surgical history        Tobacco use:   1PPD for 20 years    Allergies:   No Known Allergies      FAMILY HISTORY:  No pertinent family history in first degree relatives      HOSPITAL MEDICATIONS:   acetaminophen   Tablet .. 650 milliGRAM(s) Oral every 6 hours PRN  albuterol/ipratropium for Nebulization 3 milliLiter(s) Nebulizer every 6 hours  amLODIPine   Tablet 2.5 milliGRAM(s) Oral daily  ampicillin/sulbactam  IVPB      ampicillin/sulbactam  IVPB 3 Gram(s) IV Intermittent every 6 hours  chlorhexidine 4% Liquid 1 Application(s) Topical <User Schedule>  enoxaparin Injectable 40 milliGRAM(s) SubCutaneous daily  influenza   Vaccine 0.5 milliLiter(s) IntraMuscular once  lactated ringers. 1000 milliLiter(s) IV Continuous <Continuous>  lisinopril 5 milliGRAM(s) Oral daily  morphine  - Injectable 6 milliGRAM(s) IV Push every 6 hours PRN  oxycodone    5 mG/acetaminophen 325 mG 2 Tablet(s) Oral every 6 hours PRN  polyethylene glycol 3350 17 Gram(s) Oral daily  senna 2 Tablet(s) Oral at bedtime      Vital Signs Last 24 Hrs  T(C): 36 (13 Oct 2020 08:13), Max: 37.5 (12 Oct 2020 15:15)  T(F): 96.8 (13 Oct 2020 08:13), Max: 99.5 (12 Oct 2020 15:15)  HR: 82 (13 Oct 2020 08:13) (78 - 95)  BP: 127/88 (13 Oct 2020 08:13) (122/70 - 162/81)  BP(mean): --  RR: 20 (13 Oct 2020 08:13) (19 - 20)  SpO2: 95% (12 Oct 2020 20:21) (95% - 95%)    I&O's Summary    12 Oct 2020 07:01  -  13 Oct 2020 07:00  --------------------------------------------------------  IN: 240 mL / OUT: 0 mL / NET: 240 mL      LABS                        12.7   10.71 )-----------( 205      ( 13 Oct 2020 06:24 )             38.2       10-13    136  |  100  |  9<L>  ----------------------------<  108<H>  3.7   |  23  |  0.8    Ca    8.7      13 Oct 2020 06:24  Mg     1.8     10-12    TPro  6.6  /  Alb  3.5  /  TBili  1.0  /  DBili  x   /  AST  42<H>  /  ALT  112<H>  /  AlkPhos  127<H>  10-13        Culture - Blood (collected 11 Oct 2020 13:02)  Source: .Blood Blood  Preliminary Report (12 Oct 2020 21:01):    No growth to date.    Culture - Blood (collected 11 Oct 2020 13:02)  Source: .Blood Blood  Gram Stain (12 Oct 2020 16:53):    Growth in aerobic bottle: Gram Positive Cocci in Clusters  Preliminary Report (12 Oct 2020 16:53):    Growth in aerobic bottle: Gram Positive Cocci in Clusters    ***Blood Panel PCR results on this specimen are available    approximately 3 hours after the Gram stain result.***    Gram stain, PCR, and/or culture results may not always    correspond due to difference in methodologies.    ************************************************************    This PCR assay was performed using [employer].    The following targets are tested for: Enterococcus,    vancomycin resistant enterococci, Listeria monocytogenes,    coagulase negative staphylococci, S. aureus,    methicillin resistant S. aureus, Streptococcus agalactiae    (Group B), S. pneumoniae, S. pyogenes (Group A),    Acinetobacter baumannii, Enterobacter cloacae, E. coli,    Klebsiella oxytoca, K. pneumoniae, Proteus sp.,    Serratia marcescens, Haemophilus influenzae,    Neisseria meningitidis, Pseudomonas aeruginosa, Candida    albicans, C. glabrata, C krusei, C parapsilosis,    C. tropicalis and the KPC resistance gene.    "Due to technical problems, Proteus sp. will Not be reported as part of    the BCID panel until further notice"  Organism: Blood Culture PCR (12 Oct 2020 18:55)  Organism: Blood Culture PCR (12 Oct 2020 18:55)        PHYSICAL EXAM:  GENERAL: NAD, morbidly obese, lying in bed comfortably  HEAD:  Atraumatic, Normocephalic  EYES: EOMI, conjunctiva and sclera clear. R eye strabismus   ENT: Left facial swelling over the mandible and maxillary region, erythema of overlying skin. Tender and firm to touch   NECK: Supple, No JVD  CHEST/LUNG: Unlabored respirations, expiratory wheezing in R posterior lung fields   HEART: Regular rate and rhythm; No murmurs, rubs, or gallops  ABDOMEN: Protruding abdomen. Bowel sounds present; Soft, Nontender, Nondistended. No hepatomegaly  EXTREMITIES: No clubbing, cyanosis, or edema  NERVOUS SYSTEM:  Alert & Oriented X3, speech clear. No deficits     IMAGES:  < from: CT Maxillofacial w/ IV Cont (10.11.20 @ 14:26) >  IMPRESSION:  1.  Significant soft tissue edema overlying the left maxilla and mandible with the nonenhancing fluid collection consistent with phlegmonous changes.  2.  Prominence of the nasopharyngeal soft tissues and almost complete opacification of the mastoid air cells, the possibility of a neoplasm arising in the nasopharynx should be considered.    < from: Xray Chest 1 View- PORTABLE-Routine (Xray Chest 1 View- PORTABLE-Routine .) (10.13.20 @ 13:21) >  Impression:  No radiographic evidence of acute cardiopulmonary disease.  Low lung volumes leads to magnification of the pulmonary interstitium.

## 2020-10-14 NOTE — PROGRESS NOTE ADULT - SUBJECTIVE AND OBJECTIVE BOX
BELEN CASTILLO  45y, Male    All available historical data reviewed    OVERNIGHT EVENTS:  no fevers  feels well and has no complaints  pain left jaw reduced    ROS:  General: Denies rigors, nightsweats  HEENT: Denies headache, rhinorrhea, sore throat, eye pain  CV: Denies CP, palpitations  PULM: Denies wheezing, hemoptysis  GI: Denies hematemesis, hematochezia, melena  : Denies discharge, hematuria  MSK: Denies arthralgias, myalgias  SKIN: Denies rash, lesions  NEURO: Denies paresthesias, weakness  PSYCH: Denies depression, anxiety    VITALS:  T(F): 97.5, Max: 98.7 (10-13-20 @ 15:54)  HR: 85  BP: 118/57  RR: 18Vital Signs Last 24 Hrs  T(C): 36.4 (14 Oct 2020 07:34), Max: 37.1 (13 Oct 2020 15:54)  T(F): 97.5 (14 Oct 2020 07:34), Max: 98.7 (13 Oct 2020 15:54)  HR: 85 (14 Oct 2020 07:34) (78 - 97)  BP: 118/57 (14 Oct 2020 07:34) (116/67 - 151/67)  BP(mean): --  RR: 18 (14 Oct 2020 07:34) (17 - 20)  SpO2: 95% (13 Oct 2020 21:41) (95% - 95%)    TESTS & MEASUREMENTS:                        12.9   9.46  )-----------( 229      ( 14 Oct 2020 06:14 )             39.3     10-14    136  |  101  |  10  ----------------------------<  111<H>  3.7   |  24  |  0.8    Ca    8.7      14 Oct 2020 06:14    TPro  6.5  /  Alb  3.3<L>  /  TBili  0.9  /  DBili  x   /  AST  30  /  ALT  87<H>  /  AlkPhos  132<H>  10-14    LIVER FUNCTIONS - ( 14 Oct 2020 06:14 )  Alb: 3.3 g/dL / Pro: 6.5 g/dL / ALK PHOS: 132 U/L / ALT: 87 U/L / AST: 30 U/L / GGT: x             Culture - Blood (collected 10-11-20 @ 13:02)  Source: .Blood Blood  Preliminary Report (10-12-20 @ 21:01):    No growth to date.    Culture - Blood (collected 10-11-20 @ 13:02)  Source: .Blood Blood  Gram Stain (10-12-20 @ 16:53):    Growth in aerobic bottle: Gram Positive Cocci in Clusters  Final Report (10-13-20 @ 22:24):    Growth in aerobic bottle: Staphylococcus epidermidis    Coag Negative Staphylococcus    Single set isolate, possible contaminant. Contact    Microbiology if susceptibility testing clinically    indicated.    ***Blood Panel PCR results on this specimen are available    approximately 3 hours after the Gram stain result.***    Gram stain, PCR, and/or culture results may not always    correspond due to difference in methodologies.    ************************************************************    This PCR assay was performed using [employer].    The following targets are tested for: Enterococcus,    vancomycin resistant enterococci, Listeria monocytogenes,    coagulase negative staphylococci, S. aureus,    methicillin resistant S. aureus, Streptococcus agalactiae    (Group B), S. pneumoniae, S. pyogenes (Group A),    Acinetobacter baumannii, Enterobacter cloacae, E. coli,    Klebsiella oxytoca, K. pneumoniae, Proteus sp.,    Serratia marcescens, Haemophilus influenzae,    Neisseria meningitidis, Pseudomonas aeruginosa, Candida    albicans, C. glabrata, C krusei, C parapsilosis,    C. tropicalis and the KPC resistance gene.    "Due to technical problems, Proteus sp. will Not be reported as part of    the BCID panel until further notice"  Organism: Blood Culture PCR (10-13-20 @ 22:24)  Organism: Blood Culture PCR (10-13-20 @ 22:24)      -  Coagulase negative Staphylococcus: Detec      Method Type: PCR            RADIOLOGY & ADDITIONAL TESTS:  Personal review of radiological diagnostics performed  Echo and EKG results noted when applicable.     MEDICATIONS:  acetaminophen   Tablet .. 650 milliGRAM(s) Oral every 6 hours PRN  albuterol/ipratropium for Nebulization 3 milliLiter(s) Nebulizer every 6 hours  amLODIPine   Tablet 2.5 milliGRAM(s) Oral daily  ampicillin/sulbactam  IVPB      ampicillin/sulbactam  IVPB 3 Gram(s) IV Intermittent every 6 hours  chlorhexidine 4% Liquid 1 Application(s) Topical <User Schedule>  clindamycin IVPB 900 milliGRAM(s) IV Intermittent every 8 hours  enoxaparin Injectable 40 milliGRAM(s) SubCutaneous daily  influenza   Vaccine 0.5 milliLiter(s) IntraMuscular once  lactated ringers. 1000 milliLiter(s) IV Continuous <Continuous>  lisinopril 5 milliGRAM(s) Oral daily  morphine  - Injectable 6 milliGRAM(s) IV Push every 6 hours PRN  oxycodone    5 mG/acetaminophen 325 mG 2 Tablet(s) Oral every 6 hours PRN  polyethylene glycol 3350 17 Gram(s) Oral daily  senna 2 Tablet(s) Oral at bedtime      ANTIBIOTICS:  ampicillin/sulbactam  IVPB      ampicillin/sulbactam  IVPB 3 Gram(s) IV Intermittent every 6 hours  clindamycin IVPB 900 milliGRAM(s) IV Intermittent every 8 hours

## 2020-10-15 ENCOUNTER — TRANSCRIPTION ENCOUNTER (OUTPATIENT)
Age: 45
End: 2020-10-15

## 2020-10-15 VITALS
HEART RATE: 80 BPM | SYSTOLIC BLOOD PRESSURE: 144 MMHG | DIASTOLIC BLOOD PRESSURE: 64 MMHG | RESPIRATION RATE: 18 BRPM | TEMPERATURE: 97 F

## 2020-10-15 LAB
ANION GAP SERPL CALC-SCNC: 11 MMOL/L — SIGNIFICANT CHANGE UP (ref 7–14)
BUN SERPL-MCNC: 9 MG/DL — LOW (ref 10–20)
CALCIUM SERPL-MCNC: 9 MG/DL — SIGNIFICANT CHANGE UP (ref 8.5–10.1)
CHLORIDE SERPL-SCNC: 104 MMOL/L — SIGNIFICANT CHANGE UP (ref 98–110)
CO2 SERPL-SCNC: 25 MMOL/L — SIGNIFICANT CHANGE UP (ref 17–32)
CREAT SERPL-MCNC: 0.8 MG/DL — SIGNIFICANT CHANGE UP (ref 0.7–1.5)
GLUCOSE SERPL-MCNC: 120 MG/DL — HIGH (ref 70–99)
HCT VFR BLD CALC: 38.5 % — LOW (ref 42–52)
HGB BLD-MCNC: 12.6 G/DL — LOW (ref 14–18)
MCHC RBC-ENTMCNC: 27.5 PG — SIGNIFICANT CHANGE UP (ref 27–31)
MCHC RBC-ENTMCNC: 32.7 G/DL — SIGNIFICANT CHANGE UP (ref 32–37)
MCV RBC AUTO: 84.1 FL — SIGNIFICANT CHANGE UP (ref 80–94)
NRBC # BLD: 0 /100 WBCS — SIGNIFICANT CHANGE UP (ref 0–0)
PLATELET # BLD AUTO: 243 K/UL — SIGNIFICANT CHANGE UP (ref 130–400)
POTASSIUM SERPL-MCNC: 3.9 MMOL/L — SIGNIFICANT CHANGE UP (ref 3.5–5)
POTASSIUM SERPL-SCNC: 3.9 MMOL/L — SIGNIFICANT CHANGE UP (ref 3.5–5)
RBC # BLD: 4.58 M/UL — LOW (ref 4.7–6.1)
RBC # FLD: 13.8 % — SIGNIFICANT CHANGE UP (ref 11.5–14.5)
SODIUM SERPL-SCNC: 140 MMOL/L — SIGNIFICANT CHANGE UP (ref 135–146)
WBC # BLD: 9.34 K/UL — SIGNIFICANT CHANGE UP (ref 4.8–10.8)
WBC # FLD AUTO: 9.34 K/UL — SIGNIFICANT CHANGE UP (ref 4.8–10.8)

## 2020-10-15 PROCEDURE — 99239 HOSP IP/OBS DSCHRG MGMT >30: CPT

## 2020-10-15 RX ORDER — ACETAMINOPHEN 500 MG
2 TABLET ORAL
Qty: 0 | Refills: 0 | DISCHARGE
Start: 2020-10-15

## 2020-10-15 RX ORDER — LISINOPRIL 2.5 MG/1
1 TABLET ORAL
Qty: 0 | Refills: 0 | DISCHARGE
Start: 2020-10-15

## 2020-10-15 RX ORDER — SENNA PLUS 8.6 MG/1
2 TABLET ORAL
Qty: 20 | Refills: 0
Start: 2020-10-15 | End: 2020-10-24

## 2020-10-15 RX ORDER — AMLODIPINE BESYLATE 2.5 MG/1
1 TABLET ORAL
Qty: 30 | Refills: 0
Start: 2020-10-15 | End: 2020-11-13

## 2020-10-15 RX ORDER — LISINOPRIL 2.5 MG/1
1 TABLET ORAL
Qty: 30 | Refills: 0
Start: 2020-10-15 | End: 2020-11-13

## 2020-10-15 RX ADMIN — Medication 100 MILLIGRAM(S): at 05:45

## 2020-10-15 RX ADMIN — AMLODIPINE BESYLATE 2.5 MILLIGRAM(S): 2.5 TABLET ORAL at 05:45

## 2020-10-15 RX ADMIN — ENOXAPARIN SODIUM 40 MILLIGRAM(S): 100 INJECTION SUBCUTANEOUS at 11:14

## 2020-10-15 RX ADMIN — AMPICILLIN SODIUM AND SULBACTAM SODIUM 200 GRAM(S): 250; 125 INJECTION, POWDER, FOR SUSPENSION INTRAMUSCULAR; INTRAVENOUS at 11:14

## 2020-10-15 RX ADMIN — AMPICILLIN SODIUM AND SULBACTAM SODIUM 200 GRAM(S): 250; 125 INJECTION, POWDER, FOR SUSPENSION INTRAMUSCULAR; INTRAVENOUS at 05:45

## 2020-10-15 RX ADMIN — POLYETHYLENE GLYCOL 3350 17 GRAM(S): 17 POWDER, FOR SOLUTION ORAL at 11:15

## 2020-10-15 RX ADMIN — Medication 100 MILLIGRAM(S): at 13:09

## 2020-10-15 RX ADMIN — OXYCODONE AND ACETAMINOPHEN 2 TABLET(S): 5; 325 TABLET ORAL at 09:01

## 2020-10-15 RX ADMIN — MORPHINE SULFATE 6 MILLIGRAM(S): 50 CAPSULE, EXTENDED RELEASE ORAL at 13:47

## 2020-10-15 RX ADMIN — LISINOPRIL 5 MILLIGRAM(S): 2.5 TABLET ORAL at 05:45

## 2020-10-15 RX ADMIN — SODIUM CHLORIDE 100 MILLILITER(S): 9 INJECTION, SOLUTION INTRAVENOUS at 05:45

## 2020-10-15 RX ADMIN — OXYCODONE AND ACETAMINOPHEN 2 TABLET(S): 5; 325 TABLET ORAL at 08:41

## 2020-10-15 NOTE — DISCHARGE NOTE PROVIDER - PROVIDER TOKENS
PROVIDER:[TOKEN:[[id number]],FOLLOWUP:[1 week]],PROVIDER:[TOKEN:[[id number]],FOLLOWUP:[1 week]],PROVIDER:[TOKEN:[[id number]],FOLLOWUP:[1 month],ESTABLISHEDPATIENT:[T]] PROVIDER:[TOKEN:[[id number]],FOLLOWUP:[1 week]],PROVIDER:[TOKEN:[[id number]],FOLLOWUP:[1 week]],PROVIDER:[TOKEN:[[id number]],FOLLOWUP:[1 month],ESTABLISHEDPATIENT:[T]],PROVIDER:[TOKEN:[[id number]],FOLLOWUP:[1 month]]

## 2020-10-15 NOTE — DISCHARGE NOTE PROVIDER - NSDCFUADDAPPT_GEN_ALL_CORE_FT
Please follow up at the dental clinic at [address] [place] [address] tomorrow Please call Dr. Grace office at [phone] for follow up appointment (maxillofacial surgery)    follow up on 10/16/20 at 8:30AM in [facility] dental clinic at [address] [place] [address]

## 2020-10-15 NOTE — DISCHARGE NOTE NURSING/CASE MANAGEMENT/SOCIAL WORK - NSDCFUADDAPPT_GEN_ALL_CORE_FT
Please call Dr. Grace office at [phone] for follow up appointment (maxillofacial surgery)    follow up on 10/16/20 at 8:30AM in [facility] dental clinic at [address] [place] [address]

## 2020-10-15 NOTE — DISCHARGE NOTE PROVIDER - CARE PROVIDER_API CALL
Ashleigh Gusman  DENTISTRY  [address] [address]  Phone: [phone]  Fax: [phone]  Follow Up Time: 1 week    Venkata Solis  SURGICAL PHYSICIANS  [address] [place] [address], [address] [place] [address]  Phone: [phone]  Fax: [phone]  Follow Up Time: 1 week    Rafael Parra)  Critical Care Medicine; Pulmonary Disease; Sleep Medicine  [address] [place] [address]  Phone: [phone]  Fax: [phone]  Established Patient  Follow Up Time: 1 month   Ashleigh Gusman  DENTISTRY  [address] [place] [address]  Phone: [phone]  Fax: [phone]  Follow Up Time: 1 week    Venkata Solis  SURGICAL PHYSICIANS  [address] [place] [address], [address] [place] [address]  Phone: [phone]  Fax: [phone]  Follow Up Time: 1 week    Rafael Parra (MD)  Critical Care Medicine; Pulmonary Disease; Sleep Medicine  [address] [place] [address]  Phone: [phone]  Fax: [phone]  Established Patient  Follow Up Time: 1 month    Alexandr Gannon (DO)  Medicine  Physicians  [address] [place] [address]  Phone: [phone]  Fax: [phone]  Follow Up Time: 1 month

## 2020-10-15 NOTE — DISCHARGE NOTE PROVIDER - NSDCMRMEDTOKEN_GEN_ALL_CORE_FT
acetaminophen 325 mg oral tablet: 2 tab(s) orally every 6 hours, As needed, Temp greater or equal to 38C (100.4F), Mild Pain (1 - 3)  amLODIPine 2.5 mg oral tablet: 1 tab(s) orally once a day  Augmentin 875 mg-125 mg oral tablet: 875 milligram(s) orally every 12 hours   clindamycin 150 mg oral capsule: 1 cap(s) orally every 8 hours   clindamycin 300 mg oral capsule: 1 cap(s) orally every 8 hours   lisinopril 5 mg oral tablet: 1 tab(s) orally once a day  oxycodone-acetaminophen 5 mg-325 mg oral tablet: 2 tab(s) orally every 6 hours, As needed, Moderate Pain (4 - 6) MDD:40mg  senna oral tablet: 2 tab(s) orally once a day (at bedtime), As Needed -for constipation    acetaminophen 325 mg oral tablet: 2 tab(s) orally every 6 hours, As needed, Temp greater or equal to 38C (100.4F), Mild Pain (1 - 3)  amLODIPine 2.5 mg oral tablet: 1 tab(s) orally once a day  Augmentin 875 mg-125 mg oral tablet: 875 milligram(s) orally every 12 hours   clindamycin 150 mg oral capsule: 1 cap(s) orally every 8 hours   clindamycin 300 mg oral capsule: 1 cap(s) orally every 8 hours   lisinopril 5 mg oral tablet: 1 tab(s) orally once a day  senna oral tablet: 2 tab(s) orally once a day (at bedtime), As Needed -for constipation

## 2020-10-15 NOTE — PROGRESS NOTE ADULT - REASON FOR ADMISSION
Facial swelling

## 2020-10-15 NOTE — DISCHARGE NOTE PROVIDER - NSDCCPCAREPLAN_GEN_ALL_CORE_FT
PRINCIPAL DISCHARGE DIAGNOSIS  Diagnosis: Dental infection  Assessment and Plan of Treatment: You were found to have a left dental abscess with a left buccinator abscess. An abscess forms when an area of infection gets closed off. You have received antibiotics in the hospital. You are being discharged with antibiotics at home. Please take them as prescribed.

## 2020-10-15 NOTE — DISCHARGE NOTE PROVIDER - NSFOLLOWUPCLINICS_GEN_ALL_ED_FT
[facility]  Dental  [address] [place] [id number]  Phone: [phone]  Fax:   Follow Up Time: 1-3 days

## 2020-10-15 NOTE — CHART NOTE - NSCHARTNOTEFT_GEN_A_CORE
<<<RESIDENT DISCHARGE NOTE>>>     BELEN CASTILLO  MRN-[id number]    VITAL SIGNS:  T(F): 96.8 (10-15-20 @ 07:11), Max: 96.9 (10-14-20 @ 23:57)  HR: 80 (10-15-20 @ 07:11)  BP: 144/64 (10-15-20 @ 07:11)  SpO2: 95% (10-14-20 @ 21:25)    PHYSICAL EXAM:  GENERAL: NAD, morbidly obese, lying in bed comfortably  HEAD:  Atraumatic, Normocephalic  EYES: EOMI, conjunctiva and sclera clear. R eye strabismus   ENT: Left facial swelling over the mandible and maxillary region, erythema of overlying skin. Tender and firm to touch  NECK: Supple, No JVD  CHEST/LUNG: Unlabored respirations, expiratory wheezing in R posterior lung fields   HEART: Regular rate and rhythm; No murmurs, rubs, or gallops  ABDOMEN: Protruding abdomen. Bowel sounds present; Soft, Nontender, Nondistended. No hepatomegaly  EXTREMITIES: No clubbing, cyanosis, or edema  NERVOUS SYSTEM:  Alert & Oriented X3, speech clear. No deficits     TEST RESULTS:                        12.6   9.34  )-----------( 243      ( 15 Oct 2020 06:17 )             38.5       10-15    140  |  104  |  9<L>  ----------------------------<  120<H>  3.9   |  25  |  0.8    Ca    9.0      15 Oct 2020 06:17    TPro  6.5  /  Alb  3.3<L>  /  TBili  0.9  /  DBili  x   /  AST  30  /  ALT  87<H>  /  AlkPhos  132<H>  10-14      FINAL DISCHARGE INTERVIEW:  Resident(s) Present: (Name:_____________), RN Present: (Name:  ___________)    DISCHARGE MEDICATION RECONCILIATION  reviewed with Attending (Name:___________)    DISPOSITION:   [  ] Home,    [  ] Home with Visiting Nursing Services,   [    ]  SNF/ NH,    [   ] Acute Rehab (4A),   [   ] Other (Specify:_________)

## 2020-10-15 NOTE — PROGRESS NOTE ADULT - ASSESSMENT
45M w L buccal space infection, likely odontogenic origin; s/p i/d L [id number] w L buccal space infection, likely odontogenic origin; s/p i/d L buccinator    IMPRESSION;  Left Dental abscess with left buccinator abscess.  S/p drainage of abscess  Clinically hsa improved significantly  BCx 1/2 on 12/11 CoNS ( not a true pathogen )    RECOMMENDATIONS;  Po Augmentin 875 mg q12h  Po Clindamycin 450 mg q8h  Duration 10 more days  recall prn please

## 2020-10-15 NOTE — PROGRESS NOTE ADULT - SUBJECTIVE AND OBJECTIVE BOX
45M w L buccal space abscess, possibly of odontogenic origin. POD 2 s/p i/d buccal space abscess w exo #12,14 in clinic. Packing changed at bedside this AM and incision site drained thoroughly w saline    24 hour events: afebrile. wbc WNL    Relevant ROS: complains of L buccal vestibular pain, otherwise denies dysphagia, odynophagia, dyspnea    O:  vitals as per chart, reviewed, afebrile  labs as per chart, reviewed, WBC as above    Relevant PE:  Gen: AAOx3, NAD  HEENT:    No submandibular edema or overlying erythema; mild buccal mucosa edema  EOMI, PERRLA  No rhinorrhea  No otorrhea  T: unable to visualize uvula  Oral: trismus to 30 mm; no spontaneous drainage; #12,14 exo sites healing well    AP: 45M w L buccal space infection, likely odontogenic origin; POD2 s/p i/d L buccinator space. improving clinically    -ADAT  -appreciate ID recs for IV antibiotics  -trend labs  -OMFS will continue to follow  -appreciate ENT recs  -rest of care as per primary team  -if patient is to be discharged, please have him call Dr. Grace office at [phone] for follow up appointment and have him follow up on 10/16/20 at 830AM in [facility] dental clinic    Froylan Ellsworth DMD, MD, PGY3  d/w Dr. Gusman

## 2020-10-15 NOTE — DISCHARGE NOTE PROVIDER - CARE PROVIDERS DIRECT ADDRESSES
,DirectAddress_Unknown,[email or web address],DirectAddress_Unknown ,DirectAddress_Unknown,[email or web address],DirectAddress_Unknown,[email or web address]

## 2020-10-15 NOTE — DISCHARGE NOTE NURSING/CASE MANAGEMENT/SOCIAL WORK - PATIENT PORTAL LINK FT
You can access the FollowMyHealth Patient Portal offered by [facility] by registering at the following website: [email or web address]. By joining [employer]’s FollowMyHealth portal, you will also be able to view your health information using other applications (apps) compatible with our system.

## 2020-10-15 NOTE — PROGRESS NOTE ADULT - SUBJECTIVE AND OBJECTIVE BOX
BELEN CASTILLO  45y, Male    All available historical data reviewed    OVERNIGHT EVENTS:  no fevers  feels well and has no complaints    ROS:  General: Denies rigors, nightsweats  HEENT: Denies headache, rhinorrhea, sore throat, eye pain  CV: Denies CP, palpitations  PULM: Denies wheezing, hemoptysis  GI: Denies hematemesis, hematochezia, melena  : Denies discharge, hematuria  MSK: Denies arthralgias, myalgias  SKIN: Denies rash, lesions  NEURO: Denies paresthesias, weakness  PSYCH: Denies depression, anxiety    VITALS:  T(F): 96.8, Max: 96.9 (10-14-20 @ 23:57)  HR: 80  BP: 144/64  RR: 18Vital Signs Last 24 Hrs  T(C): 36 (15 Oct 2020 07:11), Max: 36.1 (14 Oct 2020 23:57)  T(F): 96.8 (15 Oct 2020 07:11), Max: 96.9 (14 Oct 2020 23:57)  HR: 80 (15 Oct 2020 07:11) (77 - 96)  BP: 144/64 (15 Oct 2020 07:11) (138/61 - 159/90)  BP(mean): --  RR: 18 (15 Oct 2020 07:11) (17 - 18)  SpO2: 95% (14 Oct 2020 21:25) (95% - 95%)    TESTS & MEASUREMENTS:                        12.6   9.34  )-----------( 243      ( 15 Oct 2020 06:17 )             38.5     10-15    140  |  104  |  9<L>  ----------------------------<  120<H>  3.9   |  25  |  0.8    Ca    9.0      15 Oct 2020 06:17    TPro  6.5  /  Alb  3.3<L>  /  TBili  0.9  /  DBili  x   /  AST  30  /  ALT  87<H>  /  AlkPhos  132<H>  10-14    LIVER FUNCTIONS - ( 14 Oct 2020 06:14 )  Alb: 3.3 g/dL / Pro: 6.5 g/dL / ALK PHOS: 132 U/L / ALT: 87 U/L / AST: 30 U/L / GGT: x             Culture - Blood (collected 10-11-20 @ 13:02)  Source: .Blood Blood  Preliminary Report (10-12-20 @ 21:01):    No growth to date.    Culture - Blood (collected 10-11-20 @ 13:02)  Source: .Blood Blood  Gram Stain (10-12-20 @ 16:53):    Growth in aerobic bottle: Gram Positive Cocci in Clusters  Final Report (10-13-20 @ 22:24):    Growth in aerobic bottle: Staphylococcus epidermidis    Coag Negative Staphylococcus    Single set isolate, possible contaminant. Contact    Microbiology if susceptibility testing clinically    indicated.    ***Blood Panel PCR results on this specimen are available    approximately 3 hours after the Gram stain result.***    Gram stain, PCR, and/or culture results may not always    correspond due to difference in methodologies.    ************************************************************    This PCR assay was performed using [employer].    The following targets are tested for: Enterococcus,    vancomycin resistant enterococci, Listeria monocytogenes,    coagulase negative staphylococci, S. aureus,    methicillin resistant S. aureus, Streptococcus agalactiae    (Group B), S. pneumoniae, S. pyogenes (Group A),    Acinetobacter baumannii, Enterobacter cloacae, E. coli,    Klebsiella oxytoca, K. pneumoniae, Proteus sp.,    Serratia marcescens, Haemophilus influenzae,    Neisseria meningitidis, Pseudomonas aeruginosa, Candida    albicans, C. glabrata, C krusei, C parapsilosis,    C. tropicalis and the KPC resistance gene.    "Due to technical problems, Proteus sp. will Not be reported as part of    the BCID panel until further notice"  Organism: Blood Culture PCR (10-13-20 @ 22:24)  Organism: Blood Culture PCR (10-13-20 @ 22:24)      -  Coagulase negative Staphylococcus: Detec      Method Type: PCR            RADIOLOGY & ADDITIONAL TESTS:  Personal review of radiological diagnostics performed  Echo and EKG results noted when applicable.     MEDICATIONS:  acetaminophen   Tablet .. 650 milliGRAM(s) Oral every 6 hours PRN  albuterol/ipratropium for Nebulization 3 milliLiter(s) Nebulizer every 6 hours  amLODIPine   Tablet 2.5 milliGRAM(s) Oral daily  ampicillin/sulbactam  IVPB      ampicillin/sulbactam  IVPB 3 Gram(s) IV Intermittent every 6 hours  chlorhexidine 4% Liquid 1 Application(s) Topical <User Schedule>  clindamycin IVPB 900 milliGRAM(s) IV Intermittent every 8 hours  enoxaparin Injectable 40 milliGRAM(s) SubCutaneous daily  influenza   Vaccine 0.5 milliLiter(s) IntraMuscular once  lactated ringers. 1000 milliLiter(s) IV Continuous <Continuous>  lisinopril 5 milliGRAM(s) Oral daily  morphine  - Injectable 6 milliGRAM(s) IV Push every 6 hours PRN  oxycodone    5 mG/acetaminophen 325 mG 2 Tablet(s) Oral every 6 hours PRN  polyethylene glycol 3350 17 Gram(s) Oral daily  senna 2 Tablet(s) Oral at bedtime      ANTIBIOTICS:  ampicillin/sulbactam  IVPB      ampicillin/sulbactam  IVPB 3 Gram(s) IV Intermittent every 6 hours  clindamycin IVPB 900 milliGRAM(s) IV Intermittent every 8 hours

## 2020-10-15 NOTE — DISCHARGE NOTE PROVIDER - HOSPITAL COURSE
45 years old Male with PMH of HTN, SHANELL on Bipap, h/o cellulitis, h/o amblyopia to Right eye comes in due to left facial swelling and pain s/p dental procedure.    # Buccal space infection / Dental abscess  - CT maxillofacial shows Significant soft tissue edema overlying the left maxilla and mandible with the nonenhancing fluid collection consistent with phlegmonous changes. Prominence of the nasopharyngeal soft tissues and almost complete opacification of the mastoid air cells, with possibility of a neoplasm arising in the nasopharynx.   - MRSA negative  - BCx positive for gram positive cocci in clusters coagulase negative ---likely contaminant    - MRI recommended by ENT could not be done due to habitus - patient needs F/u MRI outpatient in an open MRI  - OMFS did I&D 10/12: minimal purulence attained, mainly serosanguinous. 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  Patient to follow up as outpatient within 10 days to schedule a biopsy.   - EBV positive, flow cytometry for lymphoma results pending     # Wheezing  - pt is a current smoker, 1PPD for 20 years  - c/w duonebs     # Transaminitis  - US abdomen shows hepatic steatosis and cholelithiasis  - likely fatty liver secondary to morbid obesity   - Monitor LFTs, hepatitis panel negative  - f/u outpt with PCP    # HTN  - c/w amlodipine 2.5mg qd and lisinopril 5mg PO qd    # SHANELL   - F/u outpatient pulmonary. Saw Dr. Parra in the past  - Uses BiPAP at home to sleep; unclear what home settings are, started on BiPAP 10-5, can be titrated. Pt states he is comfortable  - Xray shows low lung volumes--> restrictive pulmonary disease, likely obesity hypoventilation syndrome     # morbid obesity  - BMI 68.3  - weight loss encouraged    45 years old Male with PMH of HTN, SHANELL on Bipap, h/o cellulitis, h/o amblyopia to Right eye comes in due to left facial swelling and pain s/p dental procedure.    # Buccal space infection / Dental abscess  - CT maxillofacial shows Significant soft tissue edema overlying the left maxilla and mandible with the nonenhancing fluid collection consistent with phlegmonous changes. Prominence of the nasopharyngeal soft tissues and almost complete opacification of the mastoid air cells, with possibility of a neoplasm arising in the nasopharynx.   - MRSA negative  - BCx positive for gram positive cocci in clusters coagulase negative ---likely contaminant    - MRI recommended by ENT could not be done due to habitus - patient needs F/u MRI outpatient in an open MRI  - OMFS did I&D 10/12: minimal purulence attained, mainly serosanguinous. 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  Patient to follow up as outpatient within 10 days to schedule a biopsy.   - EBV positive, flow cytometry for lymphoma results pending   -Inpatient was on Clinda and Unasyn; ID consult appreciated-will be discharged on:  Po Augmentin 875 mg q12h  Po Clindamycin 450 mg q8h  Duration 10 more days    # Wheezing  - pt is a current smoker, 1PPD for 20 years  - c/w aaron     # Transaminitis  - US abdomen shows hepatic steatosis and cholelithiasis  - likely fatty liver secondary to morbid obesity   - Monitor LFTs, hepatitis panel negative  - f/u outpt with PCP    # HTN  - c/w amlodipine 2.5mg qd and lisinopril 5mg PO qd    # SHANELL   - F/u outpatient pulmonary. Saw Dr. Parra in the past  - Uses BiPAP at home to sleep; unclear what home settings are, started on BiPAP 10-5, can be titrated. Pt states he is comfortable  - Xray shows low lung volumes--> restrictive pulmonary disease, likely obesity hypoventilation syndrome     # morbid obesity  - BMI 68.3  - weight loss encouraged     Patient is stable for discharge to home today; will follow with ENT and OMFS and complete 10 more days of Clinda and Augmentin PO from 10/15.

## 2020-10-16 ENCOUNTER — OUTPATIENT (OUTPATIENT)
Dept: OUTPATIENT SERVICES | Facility: HOSPITAL | Age: 45
LOS: 1 days | Discharge: HOME | End: 2020-10-16

## 2020-10-16 LAB
CULTURE RESULTS: SIGNIFICANT CHANGE UP
SPECIMEN SOURCE: SIGNIFICANT CHANGE UP

## 2020-10-19 DIAGNOSIS — Z98.818 OTHER DENTAL PROCEDURE STATUS: ICD-10-CM

## 2020-10-19 LAB — CHROM ANALY INTERPHASE BLD FISH-IMP: SIGNIFICANT CHANGE UP

## 2020-10-20 ENCOUNTER — TRANSCRIPTION ENCOUNTER (OUTPATIENT)
Age: 45
End: 2020-10-20

## 2020-10-22 ENCOUNTER — APPOINTMENT (OUTPATIENT)
Dept: INTERNAL MEDICINE | Facility: CLINIC | Age: 45
End: 2020-10-22
Payer: COMMERCIAL

## 2020-10-22 ENCOUNTER — OUTPATIENT (OUTPATIENT)
Dept: OUTPATIENT SERVICES | Facility: HOSPITAL | Age: 45
LOS: 1 days | Discharge: HOME | End: 2020-10-22

## 2020-10-22 VITALS
BODY MASS INDEX: 44.1 KG/M2 | SYSTOLIC BLOOD PRESSURE: 146 MMHG | TEMPERATURE: 97.5 F | WEIGHT: 315 LBS | HEIGHT: 71 IN | DIASTOLIC BLOOD PRESSURE: 88 MMHG | HEART RATE: 85 BPM

## 2020-10-22 DIAGNOSIS — Z88.1 ALLERGY STATUS TO OTHER ANTIBIOTIC AGENTS STATUS: ICD-10-CM

## 2020-10-22 DIAGNOSIS — Z00.00 ENCOUNTER FOR GENERAL ADULT MEDICAL EXAMINATION W/OUT ABNORMAL FINDINGS: ICD-10-CM

## 2020-10-22 DIAGNOSIS — Z00.00 ENCOUNTER FOR GENERAL ADULT MEDICAL EXAMINATION WITHOUT ABNORMAL FINDINGS: ICD-10-CM

## 2020-10-22 DIAGNOSIS — Z80.0 FAMILY HISTORY OF MALIGNANT NEOPLASM OF DIGESTIVE ORGANS: ICD-10-CM

## 2020-10-22 DIAGNOSIS — Z86.79 PERSONAL HISTORY OF OTHER DISEASES OF THE CIRCULATORY SYSTEM: ICD-10-CM

## 2020-10-22 DIAGNOSIS — E66.9 OBESITY, UNSPECIFIED: ICD-10-CM

## 2020-10-22 DIAGNOSIS — I10 ESSENTIAL (PRIMARY) HYPERTENSION: ICD-10-CM

## 2020-10-22 DIAGNOSIS — Z88.1 ALLERGY STATUS TO OTHER ANTIBIOTIC AGENTS: ICD-10-CM

## 2020-10-22 PROCEDURE — 99214 OFFICE O/P EST MOD 30 MIN: CPT

## 2020-10-22 PROCEDURE — 99203 OFFICE O/P NEW LOW 30 MIN: CPT | Mod: GC

## 2020-10-22 RX ADMIN — HYDROCORTISONE 0 %: 10 CREAM TOPICAL at 00:00

## 2020-10-22 NOTE — PHYSICAL EXAM
[Normal] : normal gait, coordination grossly intact, no focal deficits and deep tendon reflexes were 2+ and symmetric [de-identified] : swelling on the left cheek, firm, no tenderness, buccal mucosa - normal, no discharge noted [de-identified] : diffuse erythematous, maculopapular rashes all over the body, few excoriations present, no pustules

## 2020-10-22 NOTE — HEALTH RISK ASSESSMENT
[] : Yes [16-20] : 16-20 [Yes] : Yes [Monthly or less (1 pt)] : Monthly or less (1 point) [3 or 4 (1 pt)] : 3 or 4  (1 point) [Never (0 pts)] : Never (0 points) [0] : 2) Feeling down, depressed, or hopeless: Not at all (0) [With Family] : lives with family [# of Members in Household ___] :  household currently consist of [unfilled] member(s) [Employed] : employed [Single] : single [Sexually Active] : sexually active [de-identified] : Dr. Parra for SHANELL [de-identified] : quit 10 days ago. 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

## 2020-10-22 NOTE — HISTORY OF PRESENT ILLNESS
[FreeTextEntry1] : Itchy red rashes all over the body for 3 days. [de-identified] : 46 yo M presenting itchy, red rashes which developed 3 days ago, starting on upper trunk and spread over the whole body. Patient was recently admitted to the hospital for buccal space infection/dental abscess and was treated with clindamycin and Unasyn while at hospital and was discharged home on PO Augmentin 875mg q12h, PO Clindamycin 450mg q8h, amlodipine 2.5mg OD, Lisinopril 5mg OD and Percocet. \par Patient stopped all medication yesterday after the rashes got worse. He has been taking benadryl at home and says it didn't help. Patient was offered flu vaccine but wants to hold it off for now due to the rashes. \par He denies any fever, shortness of breath, lip swelling.

## 2020-10-22 NOTE — PLAN
[FreeTextEntry1] : # Itchy rashes\par - likely drug allergy (Augmentin vs clindamycin)\par - Continue benadryl\par - Hydrocortisone cream Local application\par \par # health maintenance\par - Flu vaccine \par - Routine labs (CBC, CMP, Lipid profile, A1c)\par \par # HTN\par - continue Amlodipine 2.5mg and lisinopril 5mg OD\par - follow up in 3 months\par \par # SHANELL\par - continue CPAP\par - f/u pulmonology\par \par # Morbid Obesity\par - BMI 69\par - Nutritionist referral\par \par # Dental infection/abscess\par - Drainage done in the hospital\par - dental follow up done\par - can stop antibiotics \par \par \par - RTC in 3 months\par

## 2020-10-22 NOTE — COUNSELING
[Risk of tobacco use and health benefits of smoking cessation discussed] : Risk of tobacco use and health benefits of smoking cessation discussed [FreeTextEntry2] : Patient quit smoking 10 days ago, and is motivated to remain abstinent, and doesn't think he needs to be enrolled in any program currently

## 2020-10-22 NOTE — ASSESSMENT
[FreeTextEntry1] : 46 yo M with PMH of HTN and morbid obesity, presenting with itchy, red rashes all over the body, following hospitalization for dental infection and taking oral antibiotics for 3 days.

## 2020-10-23 LAB
ALBUMIN SERPL ELPH-MCNC: 4.3 G/DL
ALP BLD-CCNC: 101 U/L
ALT SERPL-CCNC: 53 U/L
ANION GAP SERPL CALC-SCNC: 14 MMOL/L
AST SERPL-CCNC: 26 U/L
BASOPHILS # BLD AUTO: 0.03 K/UL
BASOPHILS NFR BLD AUTO: 0.3 %
BILIRUB SERPL-MCNC: 0.7 MG/DL
BUN SERPL-MCNC: 12 MG/DL
CALCIUM SERPL-MCNC: 9.3 MG/DL
CHLORIDE SERPL-SCNC: 101 MMOL/L
CHOLEST SERPL-MCNC: 192 MG/DL
CHROM ANALY OVERALL INTERP SPEC-IMP: SIGNIFICANT CHANGE UP
CO2 SERPL-SCNC: 23 MMOL/L
CREAT SERPL-MCNC: 1 MG/DL
EOSINOPHIL # BLD AUTO: 0.31 K/UL
EOSINOPHIL NFR BLD AUTO: 3.6 %
ESTIMATED AVERAGE GLUCOSE: 120 MG/DL
GLUCOSE SERPL-MCNC: 98 MG/DL
HBA1C MFR BLD HPLC: 5.8 %
HCT VFR BLD CALC: 47.3 %
HDLC SERPL-MCNC: 41 MG/DL
HGB BLD-MCNC: 15 G/DL
IMM GRANULOCYTES NFR BLD AUTO: 0.8 %
LDLC SERPL CALC-MCNC: 127 MG/DL
LYMPHOCYTES # BLD AUTO: 2.34 K/UL
LYMPHOCYTES NFR BLD AUTO: 27.1 %
MAN DIFF?: NORMAL
MCHC RBC-ENTMCNC: 27 PG
MCHC RBC-ENTMCNC: 31.7 G/DL
MCV RBC AUTO: 85.2 FL
MONOCYTES # BLD AUTO: 0.6 K/UL
MONOCYTES NFR BLD AUTO: 6.9 %
NEUTROPHILS # BLD AUTO: 5.3 K/UL
NEUTROPHILS NFR BLD AUTO: 61.3 %
NONHDLC SERPL-MCNC: 151 MG/DL
PLATELET # BLD AUTO: 379 K/UL
POTASSIUM SERPL-SCNC: 4.5 MMOL/L
PROT SERPL-MCNC: 8.1 G/DL
RBC # BLD: 5.55 M/UL
RBC # FLD: 14.5 %
SODIUM SERPL-SCNC: 138 MMOL/L
TRIGL SERPL-MCNC: 118 MG/DL
WBC # FLD AUTO: 8.65 K/UL

## 2020-10-23 NOTE — CDI QUERY NOTE - NSCDIOTHERTXTBX_GEN_ALL_CORE_HH
Dr. Hernandez,    46 y/o male comes in to the hospital due to left facial swelling and pain, s/p dental procedure.  Found to have a left dental abscess and was treated with antibiotics and had a I & D.    Please clarify if the patient's dental condition was due to the prior dental procedure.    -Facial swelling and dental abscess is due to/complication of the prior dental procedure  -Facial swelling and dental abscess is  NOT due to/complication of the prior dental procedure  -Other (please specify)  -Clinically unable to determine    Thank you for your assistance,    Anne Douglass\  CDI Department  [email or web address]

## 2020-10-26 DIAGNOSIS — D64.9 ANEMIA, UNSPECIFIED: ICD-10-CM

## 2020-10-26 DIAGNOSIS — K04.7 PERIAPICAL ABSCESS WITHOUT SINUS: ICD-10-CM

## 2020-10-26 DIAGNOSIS — E66.2 MORBID (SEVERE) OBESITY WITH ALVEOLAR HYPOVENTILATION: ICD-10-CM

## 2020-10-26 DIAGNOSIS — T81.89XA OTHER COMPLICATIONS OF PROCEDURES, NOT ELSEWHERE CLASSIFIED, INITIAL ENCOUNTER: ICD-10-CM

## 2020-10-26 DIAGNOSIS — I10 ESSENTIAL (PRIMARY) HYPERTENSION: ICD-10-CM

## 2020-10-26 DIAGNOSIS — K80.20 CALCULUS OF GALLBLADDER WITHOUT CHOLECYSTITIS WITHOUT OBSTRUCTION: ICD-10-CM

## 2020-10-26 DIAGNOSIS — K12.2 CELLULITIS AND ABSCESS OF MOUTH: ICD-10-CM

## 2020-10-26 DIAGNOSIS — R74.01 ELEVATION OF LEVELS OF LIVER TRANSAMINASE LEVELS: ICD-10-CM

## 2020-10-26 DIAGNOSIS — R73.03 PREDIABETES: ICD-10-CM

## 2020-10-26 DIAGNOSIS — F17.210 NICOTINE DEPENDENCE, CIGARETTES, UNCOMPLICATED: ICD-10-CM

## 2020-10-26 DIAGNOSIS — D49.0 NEOPLASM OF UNSPECIFIED BEHAVIOR OF DIGESTIVE SYSTEM: ICD-10-CM

## 2020-10-28 LAB — TSH SERPL-ACNC: 2.61 UIU/ML

## 2020-10-28 NOTE — REVIEW OF SYSTEMS
(3) slightly limited [Fatigue] : fatigue [Itching] : itching [Skin Rash] : skin rash [Negative] : Neurological [Fever] : no fever [Chills] : no chills [Hot Flashes] : no hot flashes [Night Sweats] : no night sweats [Recent Change In Weight] : ~T no recent weight change [Discharge] : no discharge [Pain] : no pain [Redness] : no redness [Dryness] : no dryness [Vision Problems] : no vision problems [Itching] : no itching [Earache] : no earache [Hearing Loss] : no hearing loss [Nosebleeds] : no nosebleeds [Postnasal Drip] : no postnasal drip [Nasal Discharge] : no nasal discharge [Sore Throat] : no sore throat [Hoarseness] : no hoarseness [Nail Changes] : no nail changes [Hair Changes] : no hair changes [FreeTextEntry1] : swelling on the right cheek

## 2020-11-18 RX ORDER — AMLODIPINE BESYLATE 2.5 MG/1
2.5 TABLET ORAL DAILY
Qty: 90 | Refills: 3 | Status: ACTIVE | COMMUNITY
Start: 2020-11-18 | End: 1900-01-01

## 2020-11-18 RX ORDER — LISINOPRIL 5 MG/1
5 TABLET ORAL DAILY
Qty: 90 | Refills: 2 | Status: ACTIVE | COMMUNITY
Start: 2020-11-18 | End: 1900-01-01

## 2021-01-19 ENCOUNTER — APPOINTMENT (OUTPATIENT)
Dept: INTERNAL MEDICINE | Facility: CLINIC | Age: 46
End: 2021-01-19

## 2022-04-02 ENCOUNTER — TRANSCRIPTION ENCOUNTER (OUTPATIENT)
Age: 47
End: 2022-04-02

## 2023-03-01 ENCOUNTER — NON-APPOINTMENT (OUTPATIENT)
Age: 48
End: 2023-03-01

## 2023-05-07 ENCOUNTER — NON-APPOINTMENT (OUTPATIENT)
Age: 48
End: 2023-05-07

## 2023-10-03 NOTE — ED ADULT TRIAGE NOTE - NSWEIGHTCALCTOOLDRUG_GEN_A_CORE
Thank you for letting me care for you today - it was nice to meet you and I hope you feel better soon. Please return to the ER if your symptoms don't improve or get worse. And be sure to follow up with your primary care provider within the next week for follow up care. [facility] will call you within 48 hours to make an appointment, or you can call [phone] to schedule.     Our goal at [facility] is to always give you outstanding care and exceptional service. You may receive a survey by mail or email in the next week about your experience in our ED. We would greatly appreciate you completing and returning the survey. Your feedback provides us with a way to recognize our staff who give very good care and it helps us learn how to improve when your experience was below our aspiration of excellence.     All the best,     Deyanira Riley, MPH, PA-C  Emergency Department Physician Assistant  [facility], [facility]        used

## 2024-07-29 ENCOUNTER — NON-APPOINTMENT (OUTPATIENT)
Age: 49
End: 2024-07-29

## 2024-11-09 NOTE — PATIENT PROFILE ADULT - NSPROSPIRITUALVALUESFT_GEN_A_NUR

## 2025-04-08 NOTE — ED ADULT NURSE NOTE - NS_NURSE_DISC_TEACHING_YN_ED_ALL_ED
Health Maintenance       Meningococcal Serogroup B Vaccine (1 of 2 - Standard)  Never done    Chlamydia and Gonorrhea Screening (if sexually active) (Yearly)  Never done    COVID-19 Vaccine (3 - 2024-25 season)  Overdue since 9/1/2024           Following review of the above:  Patient is not proceeding with: Chlamydia and Gonorrhea Screening, COVID-19, and Meningococcal Serogroup B    Note: Refer to final orders and clinician documentation.        Recent PHQ 2/9 Score    PHQ 2:  PHQ 2 Score Peds PHQ 2 Score Peds PHQ 2 Interpretation   5/3/2021  10:10 AM 1 No further screening needed       PHQ 9:  PHQ 9 Score Peds PHQ 9 Score Peds PHQ 9 Interpretation   5/3/2021  10:10 AM 16 Moderately Severe Depression            Yes

## 2025-06-08 ENCOUNTER — NON-APPOINTMENT (OUTPATIENT)
Age: 50
End: 2025-06-08